# Patient Record
Sex: MALE | HISPANIC OR LATINO | Employment: OTHER | URBAN - METROPOLITAN AREA
[De-identification: names, ages, dates, MRNs, and addresses within clinical notes are randomized per-mention and may not be internally consistent; named-entity substitution may affect disease eponyms.]

---

## 2017-12-28 ENCOUNTER — OFFICE VISIT CONVERTED (OUTPATIENT)
Dept: PULMONOLOGY | Facility: CLINIC | Age: 72
End: 2017-12-28
Attending: INTERNAL MEDICINE

## 2019-09-05 ENCOUNTER — HOSPITAL ENCOUNTER (OUTPATIENT)
Dept: FAMILY MEDICINE CLINIC | Facility: CLINIC | Age: 74
Discharge: HOME | End: 2019-09-05
Attending: FAMILY MEDICINE

## 2019-09-05 ENCOUNTER — OFFICE VISIT CONVERTED (OUTPATIENT)
Dept: FAMILY MEDICINE CLINIC | Facility: CLINIC | Age: 74
End: 2019-09-05
Attending: FAMILY MEDICINE

## 2019-09-05 LAB
ALBUMIN SERPL-MCNC: 3.9 G/DL (ref 3.5–5)
ALBUMIN/GLOB SERPL: 1.1 {RATIO} (ref 1.4–2.6)
ALP SERPL-CCNC: 101 U/L (ref 56–155)
ALT SERPL-CCNC: 22 U/L (ref 10–40)
ANION GAP SERPL CALC-SCNC: 25 MMOL/L (ref 8–19)
AST SERPL-CCNC: 37 U/L (ref 15–50)
BASOPHILS # BLD AUTO: 0.05 10*3/UL (ref 0–0.2)
BASOPHILS NFR BLD AUTO: 0.9 % (ref 0–3)
BILIRUB SERPL-MCNC: 1.72 MG/DL (ref 0.2–1.3)
BNP SERPL-MCNC: 6656 PG/ML (ref 0–900)
BUN SERPL-MCNC: 35 MG/DL (ref 5–25)
BUN/CREAT SERPL: 21 {RATIO} (ref 6–20)
CALCIUM SERPL-MCNC: 9.7 MG/DL (ref 8.7–10.4)
CHLORIDE SERPL-SCNC: 96 MMOL/L (ref 99–111)
CONV ABS IMM GRAN: 0.01 10*3/UL (ref 0–0.2)
CONV CO2: 26 MMOL/L (ref 22–32)
CONV IMMATURE GRAN: 0.2 % (ref 0–1.8)
CONV TOTAL PROTEIN: 7.3 G/DL (ref 6.3–8.2)
CREAT UR-MCNC: 1.65 MG/DL (ref 0.7–1.2)
DEPRECATED RDW RBC AUTO: 60.5 FL (ref 35.1–43.9)
EOSINOPHIL # BLD AUTO: 0.36 10*3/UL (ref 0–0.7)
EOSINOPHIL # BLD AUTO: 6.2 % (ref 0–7)
ERYTHROCYTE [DISTWIDTH] IN BLOOD BY AUTOMATED COUNT: 15.9 % (ref 11.6–14.4)
GFR SERPLBLD BASED ON 1.73 SQ M-ARVRAT: 40 ML/MIN/{1.73_M2}
GLOBULIN UR ELPH-MCNC: 3.4 G/DL (ref 2–3.5)
GLUCOSE SERPL-MCNC: 79 MG/DL (ref 70–99)
HCT VFR BLD AUTO: 57.3 % (ref 42–52)
HGB BLD-MCNC: 18.2 G/DL (ref 14–18)
LYMPHOCYTES # BLD AUTO: 0.79 10*3/UL (ref 1–5)
LYMPHOCYTES NFR BLD AUTO: 13.6 % (ref 20–45)
MCH RBC QN AUTO: 32.4 PG (ref 27–31)
MCHC RBC AUTO-ENTMCNC: 31.8 G/DL (ref 33–37)
MCV RBC AUTO: 102.1 FL (ref 80–96)
MONOCYTES # BLD AUTO: 0.65 10*3/UL (ref 0.2–1.2)
MONOCYTES NFR BLD AUTO: 11.2 % (ref 3–10)
NEUTROPHILS # BLD AUTO: 3.95 10*3/UL (ref 2–8)
NEUTROPHILS NFR BLD AUTO: 67.9 % (ref 30–85)
NRBC CBCN: 0 % (ref 0–0.7)
OSMOLALITY SERPL CALC.SUM OF ELEC: 303 MOSM/KG (ref 273–304)
PLATELET # BLD AUTO: 144 10*3/UL (ref 130–400)
PMV BLD AUTO: 10.7 FL (ref 9.4–12.4)
POTASSIUM SERPL-SCNC: 4.2 MMOL/L (ref 3.5–5.3)
PSA SERPL-MCNC: 6.41 NG/ML (ref 0–4)
RBC # BLD AUTO: 5.61 10*6/UL (ref 4.7–6.1)
SODIUM SERPL-SCNC: 143 MMOL/L (ref 135–147)
WBC # BLD AUTO: 5.81 10*3/UL (ref 4.8–10.8)

## 2019-09-26 ENCOUNTER — PATIENT OUTREACH - CONVERTED (OUTPATIENT)
Dept: FAMILY MEDICINE CLINIC | Facility: CLINIC | Age: 74
End: 2019-09-26
Attending: FAMILY MEDICINE

## 2019-12-05 ENCOUNTER — HOSPITAL ENCOUNTER (OUTPATIENT)
Dept: FAMILY MEDICINE CLINIC | Facility: CLINIC | Age: 74
Discharge: HOME | End: 2019-12-05
Attending: FAMILY MEDICINE

## 2019-12-05 ENCOUNTER — OFFICE VISIT CONVERTED (OUTPATIENT)
Dept: FAMILY MEDICINE CLINIC | Facility: CLINIC | Age: 74
End: 2019-12-05
Attending: FAMILY MEDICINE

## 2019-12-05 LAB
ALBUMIN SERPL-MCNC: 4.4 G/DL (ref 3.5–5)
ALBUMIN/GLOB SERPL: 1.4 {RATIO} (ref 1.4–2.6)
ALP SERPL-CCNC: 164 U/L (ref 56–155)
ALT SERPL-CCNC: 16 U/L (ref 10–40)
ANION GAP SERPL CALC-SCNC: 22 MMOL/L (ref 8–19)
AST SERPL-CCNC: 21 U/L (ref 15–50)
BASOPHILS # BLD AUTO: 0.06 10*3/UL (ref 0–0.2)
BASOPHILS NFR BLD AUTO: 0.9 % (ref 0–3)
BILIRUB SERPL-MCNC: 0.48 MG/DL (ref 0.2–1.3)
BUN SERPL-MCNC: 30 MG/DL (ref 5–25)
BUN/CREAT SERPL: 16 {RATIO} (ref 6–20)
CALCIUM SERPL-MCNC: 9.4 MG/DL (ref 8.7–10.4)
CHLORIDE SERPL-SCNC: 103 MMOL/L (ref 99–111)
CONV ABS IMM GRAN: 0.02 10*3/UL (ref 0–0.2)
CONV CO2: 24 MMOL/L (ref 22–32)
CONV IMMATURE GRAN: 0.3 % (ref 0–1.8)
CONV TOTAL PROTEIN: 7.6 G/DL (ref 6.3–8.2)
CREAT UR-MCNC: 1.89 MG/DL (ref 0.7–1.2)
DEPRECATED RDW RBC AUTO: 47.7 FL (ref 35.1–43.9)
EOSINOPHIL # BLD AUTO: 0.34 10*3/UL (ref 0–0.7)
EOSINOPHIL # BLD AUTO: 5.2 % (ref 0–7)
ERYTHROCYTE [DISTWIDTH] IN BLOOD BY AUTOMATED COUNT: 12.9 % (ref 11.6–14.4)
GFR SERPLBLD BASED ON 1.73 SQ M-ARVRAT: 34 ML/MIN/{1.73_M2}
GLOBULIN UR ELPH-MCNC: 3.2 G/DL (ref 2–3.5)
GLUCOSE SERPL-MCNC: 110 MG/DL (ref 70–99)
HCT VFR BLD AUTO: 48.9 % (ref 42–52)
HGB BLD-MCNC: 15.9 G/DL (ref 14–18)
LYMPHOCYTES # BLD AUTO: 1.2 10*3/UL (ref 1–5)
LYMPHOCYTES NFR BLD AUTO: 18.3 % (ref 20–45)
MCH RBC QN AUTO: 32.4 PG (ref 27–31)
MCHC RBC AUTO-ENTMCNC: 32.5 G/DL (ref 33–37)
MCV RBC AUTO: 99.6 FL (ref 80–96)
MONOCYTES # BLD AUTO: 0.6 10*3/UL (ref 0.2–1.2)
MONOCYTES NFR BLD AUTO: 9.2 % (ref 3–10)
NEUTROPHILS # BLD AUTO: 4.32 10*3/UL (ref 2–8)
NEUTROPHILS NFR BLD AUTO: 66.1 % (ref 30–85)
NRBC CBCN: 0 % (ref 0–0.7)
OSMOLALITY SERPL CALC.SUM OF ELEC: 307 MOSM/KG (ref 273–304)
PLATELET # BLD AUTO: 159 10*3/UL (ref 130–400)
PMV BLD AUTO: 10.8 FL (ref 9.4–12.4)
POTASSIUM SERPL-SCNC: 4.2 MMOL/L (ref 3.5–5.3)
PSA SERPL-MCNC: 4.49 NG/ML (ref 0–4)
RBC # BLD AUTO: 4.91 10*6/UL (ref 4.7–6.1)
SODIUM SERPL-SCNC: 145 MMOL/L (ref 135–147)
WBC # BLD AUTO: 6.54 10*3/UL (ref 4.8–10.8)

## 2020-01-01 ENCOUNTER — OFFICE (OUTPATIENT)
Dept: RURAL CLINIC 3 | Facility: CLINIC | Age: 75
End: 2020-01-01
Payer: MEDICAID

## 2020-01-01 VITALS
WEIGHT: 225 LBS | SYSTOLIC BLOOD PRESSURE: 189 MMHG | HEART RATE: 72 BPM | HEIGHT: 70 IN | DIASTOLIC BLOOD PRESSURE: 97 MMHG

## 2020-01-01 DIAGNOSIS — I48.91 UNSPECIFIED ATRIAL FIBRILLATION: ICD-10-CM

## 2020-01-01 DIAGNOSIS — R74.01 ELEVATION OF LEVELS OF LIVER TRANSAMINASE LEVELS: ICD-10-CM

## 2020-01-01 DIAGNOSIS — K85.90 ACUTE PANCREATITIS WITHOUT NECROSIS OR INFECTION, UNSPECIFIE: ICD-10-CM

## 2020-01-01 DIAGNOSIS — Z79.01 LONG TERM (CURRENT) USE OF ANTICOAGULANTS: ICD-10-CM

## 2020-01-01 DIAGNOSIS — Z86.010 PERSONAL HISTORY OF COLONIC POLYPS: ICD-10-CM

## 2020-01-01 DIAGNOSIS — N18.4 CHRONIC KIDNEY DISEASE, STAGE 4 (SEVERE): ICD-10-CM

## 2020-01-01 PROCEDURE — 99204 OFFICE O/P NEW MOD 45 MIN: CPT | Performed by: INTERNAL MEDICINE

## 2020-01-06 ENCOUNTER — HOSPITAL ENCOUNTER (OUTPATIENT)
Dept: FAMILY MEDICINE CLINIC | Facility: CLINIC | Age: 75
Discharge: HOME | End: 2020-01-06
Attending: FAMILY MEDICINE

## 2020-01-06 ENCOUNTER — OFFICE VISIT CONVERTED (OUTPATIENT)
Dept: FAMILY MEDICINE CLINIC | Facility: CLINIC | Age: 75
End: 2020-01-06
Attending: FAMILY MEDICINE

## 2020-01-06 LAB
ALBUMIN SERPL-MCNC: 4.3 G/DL (ref 3.5–5)
ALBUMIN/GLOB SERPL: 1.7 {RATIO} (ref 1.4–2.6)
ALP SERPL-CCNC: 123 U/L (ref 56–155)
ALT SERPL-CCNC: 18 U/L (ref 10–40)
ANION GAP SERPL CALC-SCNC: 16 MMOL/L (ref 8–19)
AST SERPL-CCNC: 22 U/L (ref 15–50)
BILIRUB SERPL-MCNC: 0.48 MG/DL (ref 0.2–1.3)
BUN SERPL-MCNC: 17 MG/DL (ref 5–25)
BUN/CREAT SERPL: 11 {RATIO} (ref 6–20)
CALCIUM SERPL-MCNC: 9.1 MG/DL (ref 8.7–10.4)
CHLORIDE SERPL-SCNC: 110 MMOL/L (ref 99–111)
CONV CO2: 22 MMOL/L (ref 22–32)
CONV TOTAL PROTEIN: 6.9 G/DL (ref 6.3–8.2)
CREAT UR-MCNC: 1.55 MG/DL (ref 0.7–1.2)
EST. AVERAGE GLUCOSE BLD GHB EST-MCNC: 111 MG/DL
GFR SERPLBLD BASED ON 1.73 SQ M-ARVRAT: 43 ML/MIN/{1.73_M2}
GLOBULIN UR ELPH-MCNC: 2.6 G/DL (ref 2–3.5)
GLUCOSE SERPL-MCNC: 102 MG/DL (ref 70–99)
HBA1C MFR BLD: 5.5 % (ref 3.5–5.7)
OSMOLALITY SERPL CALC.SUM OF ELEC: 300 MOSM/KG (ref 273–304)
POTASSIUM SERPL-SCNC: 4.1 MMOL/L (ref 3.5–5.3)
SODIUM SERPL-SCNC: 144 MMOL/L (ref 135–147)

## 2020-02-03 ENCOUNTER — OFFICE VISIT CONVERTED (OUTPATIENT)
Dept: FAMILY MEDICINE CLINIC | Facility: CLINIC | Age: 75
End: 2020-02-03
Attending: FAMILY MEDICINE

## 2020-02-20 ENCOUNTER — HOSPITAL ENCOUNTER (OUTPATIENT)
Dept: SLEEP MEDICINE | Facility: HOSPITAL | Age: 75
Discharge: HOME | End: 2020-02-20
Attending: INTERNAL MEDICINE

## 2020-02-26 ENCOUNTER — PATIENT OUTREACH - CONVERTED (OUTPATIENT)
Dept: FAMILY MEDICINE CLINIC | Facility: CLINIC | Age: 75
End: 2020-02-26
Attending: FAMILY MEDICINE

## 2020-03-02 ENCOUNTER — OFFICE VISIT CONVERTED (OUTPATIENT)
Dept: FAMILY MEDICINE CLINIC | Facility: CLINIC | Age: 75
End: 2020-03-02
Attending: FAMILY MEDICINE

## 2020-03-16 ENCOUNTER — OFFICE VISIT CONVERTED (OUTPATIENT)
Dept: FAMILY MEDICINE CLINIC | Facility: CLINIC | Age: 75
End: 2020-03-16
Attending: FAMILY MEDICINE

## 2020-03-16 ENCOUNTER — CONVERSION ENCOUNTER (OUTPATIENT)
Dept: FAMILY MEDICINE CLINIC | Facility: CLINIC | Age: 75
End: 2020-03-16

## 2020-03-30 ENCOUNTER — PATIENT OUTREACH - CONVERTED (OUTPATIENT)
Dept: FAMILY MEDICINE CLINIC | Facility: CLINIC | Age: 75
End: 2020-03-30
Attending: FAMILY MEDICINE

## 2020-03-31 ENCOUNTER — CONVERSION ENCOUNTER (OUTPATIENT)
Dept: FAMILY MEDICINE CLINIC | Facility: CLINIC | Age: 75
End: 2020-03-31

## 2020-03-31 ENCOUNTER — OFFICE VISIT CONVERTED (OUTPATIENT)
Dept: FAMILY MEDICINE CLINIC | Facility: CLINIC | Age: 75
End: 2020-03-31
Attending: FAMILY MEDICINE

## 2020-05-26 ENCOUNTER — HOSPITAL ENCOUNTER (OUTPATIENT)
Dept: OTHER | Facility: HOSPITAL | Age: 75
Discharge: HOME | End: 2020-05-26
Attending: FAMILY MEDICINE

## 2020-05-26 LAB
ANION GAP SERPL CALC-SCNC: 22 MMOL/L (ref 8–19)
BNP SERPL-MCNC: 6788 PG/ML (ref 0–900)
BUN SERPL-MCNC: 32 MG/DL (ref 5–25)
BUN/CREAT SERPL: 18 {RATIO} (ref 6–20)
CALCIUM SERPL-MCNC: 9.4 MG/DL (ref 8.7–10.4)
CHLORIDE SERPL-SCNC: 108 MMOL/L (ref 99–111)
CONV CO2: 18 MMOL/L (ref 22–32)
CREAT UR-MCNC: 1.73 MG/DL (ref 0.7–1.2)
GFR SERPLBLD BASED ON 1.73 SQ M-ARVRAT: 38 ML/MIN/{1.73_M2}
GLUCOSE SERPL-MCNC: 92 MG/DL (ref 70–99)
OSMOLALITY SERPL CALC.SUM OF ELEC: 303 MOSM/KG (ref 273–304)
POTASSIUM SERPL-SCNC: 4.5 MMOL/L (ref 3.5–5.3)
SODIUM SERPL-SCNC: 143 MMOL/L (ref 135–147)

## 2020-07-29 ENCOUNTER — PATIENT OUTREACH - CONVERTED (OUTPATIENT)
Dept: FAMILY MEDICINE CLINIC | Facility: CLINIC | Age: 75
End: 2020-07-29
Attending: FAMILY MEDICINE

## 2020-09-17 ENCOUNTER — OFFICE VISIT CONVERTED (OUTPATIENT)
Dept: FAMILY MEDICINE CLINIC | Facility: CLINIC | Age: 75
End: 2020-09-17
Attending: FAMILY MEDICINE

## 2020-10-13 ENCOUNTER — HOSPITAL ENCOUNTER (OUTPATIENT)
Dept: FAMILY MEDICINE CLINIC | Facility: CLINIC | Age: 75
Discharge: HOME | End: 2020-10-13
Attending: NURSE PRACTITIONER

## 2020-10-13 ENCOUNTER — OFFICE VISIT CONVERTED (OUTPATIENT)
Dept: FAMILY MEDICINE CLINIC | Facility: CLINIC | Age: 75
End: 2020-10-13
Attending: NURSE PRACTITIONER

## 2020-10-13 LAB
ALBUMIN SERPL-MCNC: 4.3 G/DL (ref 3.5–5)
ALBUMIN/GLOB SERPL: 1.3 {RATIO} (ref 1.4–2.6)
ALP SERPL-CCNC: 80 U/L (ref 56–155)
ALT SERPL-CCNC: 47 U/L (ref 10–40)
AMYLASE SERPL-CCNC: 167 U/L (ref 30–150)
ANION GAP SERPL CALC-SCNC: 17 MMOL/L (ref 8–19)
AST SERPL-CCNC: 27 U/L (ref 15–50)
BILIRUB SERPL-MCNC: 0.66 MG/DL (ref 0.2–1.3)
BUN SERPL-MCNC: 23 MG/DL (ref 5–25)
BUN/CREAT SERPL: 13 {RATIO} (ref 6–20)
CALCIUM SERPL-MCNC: 9.4 MG/DL (ref 8.7–10.4)
CHLORIDE SERPL-SCNC: 99 MMOL/L (ref 99–111)
CONV CO2: 27 MMOL/L (ref 22–32)
CONV TOTAL PROTEIN: 7.7 G/DL (ref 6.3–8.2)
CREAT UR-MCNC: 1.81 MG/DL (ref 0.7–1.2)
GFR SERPLBLD BASED ON 1.73 SQ M-ARVRAT: 36 ML/MIN/{1.73_M2}
GLOBULIN UR ELPH-MCNC: 3.4 G/DL (ref 2–3.5)
GLUCOSE SERPL-MCNC: 107 MG/DL (ref 70–99)
LIPASE SERPL-CCNC: 373 U/L (ref 5–51)
OSMOLALITY SERPL CALC.SUM OF ELEC: 292 MOSM/KG (ref 273–304)
POTASSIUM SERPL-SCNC: 4.1 MMOL/L (ref 3.5–5.3)
SODIUM SERPL-SCNC: 139 MMOL/L (ref 135–147)

## 2020-10-22 ENCOUNTER — HOSPITAL ENCOUNTER (OUTPATIENT)
Dept: FAMILY MEDICINE CLINIC | Facility: CLINIC | Age: 75
Discharge: HOME | End: 2020-10-22
Attending: NURSE PRACTITIONER

## 2020-10-22 LAB
ALBUMIN SERPL-MCNC: 4.1 G/DL (ref 3.5–5)
ALBUMIN/GLOB SERPL: 1.4 {RATIO} (ref 1.4–2.6)
ALP SERPL-CCNC: 88 U/L (ref 56–155)
ALT SERPL-CCNC: 18 U/L (ref 10–40)
AMYLASE SERPL-CCNC: 176 U/L (ref 30–150)
ANION GAP SERPL CALC-SCNC: 16 MMOL/L (ref 8–19)
AST SERPL-CCNC: 23 U/L (ref 15–50)
BASOPHILS # BLD AUTO: 0.07 10*3/UL (ref 0–0.2)
BASOPHILS NFR BLD AUTO: 1 % (ref 0–3)
BILIRUB SERPL-MCNC: 0.4 MG/DL (ref 0.2–1.3)
BUN SERPL-MCNC: 36 MG/DL (ref 5–25)
BUN/CREAT SERPL: 24 {RATIO} (ref 6–20)
CALCIUM SERPL-MCNC: 9.5 MG/DL (ref 8.7–10.4)
CHLORIDE SERPL-SCNC: 106 MMOL/L (ref 99–111)
CHOLEST SERPL-MCNC: 188 MG/DL (ref 107–200)
CHOLEST/HDLC SERPL: 4.3 {RATIO} (ref 3–6)
CONV ABS IMM GRAN: 0.04 10*3/UL (ref 0–0.2)
CONV BILI, CONJUGATED: <0.2 MG/DL (ref 0–0.6)
CONV CO2: 25 MMOL/L (ref 22–32)
CONV IMMATURE GRAN: 0.6 % (ref 0–1.8)
CONV TOTAL PROTEIN: 7.1 G/DL (ref 6.3–8.2)
CONV UNCONJUGATED BILIRUBIN: 0.2 MG/DL (ref 0–1.1)
CREAT UR-MCNC: 1.53 MG/DL (ref 0.7–1.2)
DEPRECATED RDW RBC AUTO: 46.4 FL (ref 35.1–43.9)
EOSINOPHIL # BLD AUTO: 0.16 10*3/UL (ref 0–0.7)
EOSINOPHIL # BLD AUTO: 2.3 % (ref 0–7)
ERYTHROCYTE [DISTWIDTH] IN BLOOD BY AUTOMATED COUNT: 13 % (ref 11.6–14.4)
GFR SERPLBLD BASED ON 1.73 SQ M-ARVRAT: 44 ML/MIN/{1.73_M2}
GLOBULIN UR ELPH-MCNC: 3 G/DL (ref 2–3.5)
GLUCOSE SERPL-MCNC: 114 MG/DL (ref 70–99)
HCT VFR BLD AUTO: 46.8 % (ref 42–52)
HDLC SERPL-MCNC: 44 MG/DL (ref 40–60)
HGB BLD-MCNC: 15 G/DL (ref 14–18)
LDLC SERPL CALC-MCNC: 109 MG/DL (ref 70–100)
LIPASE SERPL-CCNC: 275 U/L (ref 5–51)
LYMPHOCYTES # BLD AUTO: 1.76 10*3/UL (ref 1–5)
LYMPHOCYTES NFR BLD AUTO: 25.7 % (ref 20–45)
MCH RBC QN AUTO: 31.1 PG (ref 27–31)
MCHC RBC AUTO-ENTMCNC: 32.1 G/DL (ref 33–37)
MCV RBC AUTO: 96.9 FL (ref 80–96)
MONOCYTES # BLD AUTO: 0.44 10*3/UL (ref 0.2–1.2)
MONOCYTES NFR BLD AUTO: 6.4 % (ref 3–10)
NEUTROPHILS # BLD AUTO: 4.39 10*3/UL (ref 2–8)
NEUTROPHILS NFR BLD AUTO: 64 % (ref 30–85)
NRBC CBCN: 0 % (ref 0–0.7)
OSMOLALITY SERPL CALC.SUM OF ELEC: 303 MOSM/KG (ref 273–304)
PLATELET # BLD AUTO: 210 10*3/UL (ref 130–400)
PMV BLD AUTO: 9.9 FL (ref 9.4–12.4)
POTASSIUM SERPL-SCNC: 4.9 MMOL/L (ref 3.5–5.3)
RBC # BLD AUTO: 4.83 10*6/UL (ref 4.7–6.1)
SODIUM SERPL-SCNC: 142 MMOL/L (ref 135–147)
T4 FREE SERPL-MCNC: 1.2 NG/DL (ref 0.9–1.8)
TRIGL SERPL-MCNC: 175 MG/DL (ref 40–150)
TSH SERPL-ACNC: 2.18 M[IU]/L (ref 0.27–4.2)
VLDLC SERPL-MCNC: 35 MG/DL (ref 5–37)
WBC # BLD AUTO: 6.86 10*3/UL (ref 4.8–10.8)

## 2020-10-30 ENCOUNTER — HOSPITAL ENCOUNTER (OUTPATIENT)
Dept: FAMILY MEDICINE CLINIC | Facility: CLINIC | Age: 75
Discharge: HOME | End: 2020-10-30
Attending: NURSE PRACTITIONER

## 2020-10-31 LAB
AMYLASE SERPL-CCNC: 129 U/L (ref 30–150)
LIPASE SERPL-CCNC: 164 U/L (ref 5–51)

## 2020-11-16 ENCOUNTER — OFFICE VISIT CONVERTED (OUTPATIENT)
Dept: PULMONOLOGY | Facility: CLINIC | Age: 75
End: 2020-11-16
Attending: INTERNAL MEDICINE

## 2020-11-30 ENCOUNTER — PATIENT OUTREACH - CONVERTED (OUTPATIENT)
Dept: FAMILY MEDICINE CLINIC | Facility: CLINIC | Age: 75
End: 2020-11-30
Attending: FAMILY MEDICINE

## 2021-01-26 ENCOUNTER — CONVERSION ENCOUNTER (OUTPATIENT)
Dept: FAMILY MEDICINE CLINIC | Facility: CLINIC | Age: 76
End: 2021-01-26

## 2021-01-26 ENCOUNTER — OFFICE VISIT CONVERTED (OUTPATIENT)
Dept: FAMILY MEDICINE CLINIC | Facility: CLINIC | Age: 76
End: 2021-01-26
Attending: FAMILY MEDICINE

## 2021-01-28 ENCOUNTER — PATIENT OUTREACH - CONVERTED (OUTPATIENT)
Dept: FAMILY MEDICINE CLINIC | Facility: CLINIC | Age: 76
End: 2021-01-28
Attending: FAMILY MEDICINE

## 2021-01-28 ENCOUNTER — HOSPITAL ENCOUNTER (OUTPATIENT)
Dept: OTHER | Facility: HOSPITAL | Age: 76
Discharge: HOME | End: 2021-01-28
Attending: FAMILY MEDICINE

## 2021-03-08 ENCOUNTER — PATIENT OUTREACH - CONVERTED (OUTPATIENT)
Dept: FAMILY MEDICINE CLINIC | Facility: CLINIC | Age: 76
End: 2021-03-08
Attending: FAMILY MEDICINE

## 2021-03-17 ENCOUNTER — HOSPITAL ENCOUNTER (OUTPATIENT)
Dept: VACCINE CLINIC | Facility: HOSPITAL | Age: 76
Discharge: HOME | End: 2021-03-17
Attending: INTERNAL MEDICINE

## 2021-05-07 NOTE — PROGRESS NOTES
Progress Note      Patient Name: Edmond Lujan   Patient ID: [id number]   Sex: Male   YOB: 1945    Primary Care Provider: Ambrocio Guy MD   Referring Provider: Ambrocio Guy MD    Visit Date: January 6, 2020    Provider: Ambrocio Guy MD   Location: [facility]   Location Address: [address] [place] [address] [place]  [address]   Location Phone: [phone]          Chief Complaint     refills       History Of Present Illness  Edmond Lujan is a 74 year old Other Race,  or  male who presents for evaluation and treatment of:      discussed labs  elevated PSA- reminded pt of his appt to urology for elevated PSA  elevated blood glucose  pt needs cane due to trouble walking with CHF, COPD       Past Medical History  Disease Name Date Onset Notes   Atrial Fibrillation --  --    Benign prostatic hypertrophy (BPH) with incomplete bladder emptying --  --    Chronic kidney disease --  --    Decreased GFR --  --    Dermatitis --  --    Dysuria --  --    GERD (gastroesophageal reflux disease) --  --    Hematuria --  --    High triglycerides --  --    Hyperlipidemia --  --    Hypertension --  --    Osteoarthritis --  --    Renal insufficiency --  --    Thrombocytopenia --  --          Past Surgical History  Procedure Name Date Notes   Hip Surgery --  --    Prostate Surgery --  --          Medication List  Name Date Started Instructions   allopurinol 100 mg oral tablet 10/02/2019 take 1 tablet (100 mg) by oral route once daily for 90 days   Anoro Ellipta 62.5-25 mcg/actuation inhalation blister with device 10/02/2019 inhale 1 puff by inhalation route once daily at the same time each day for 90 days   atorvastatin 40 mg oral tablet 10/02/2019 take 1 tablet (40 mg) by oral route once daily at bedtime for 90 days   Cartia  mg oral capsule,extended release 24hr 10/02/2019 take 1 capsule (240 mg) by oral route once daily for 90 days   Eliquis 5 mg oral tablet 09/26/2019  take 1 tablet by oral route 2 times a day for 90 days   fluticasone propionate 50 mcg/actuation nasal spray,suspension 09/16/2019 spray 1 spray (50 mcg) in each nostril by intranasal route once daily for 30 days   furosemide 80 mg oral tablet 10/02/2019 take 1 tablet (80 mg) by oral route once daily for 90 days   hydralazine 10 mg oral tablet 10/02/2019 take 1 tablet (10 mg) by oral route 4 times per day with food for 90 days   Incruse Ellipta 62.5 mcg/actuation inhalation blister with device 09/16/2019 inhale 1 puff daily   omega-3 acid ethyl esters 1 gram oral capsule 09/16/2019 TAKE TWO CAPSULES BY MOUTH TWICE A DAY   potassium chloride 10 mEq oral capsule, extended release 10/02/2019 take 1 capsule (10 meq) by oral route 2 times per day with food for 90 days   ProAir HFA 90 mcg/actuation inhalation HFA aerosol inhaler 09/16/2019 inhale 1 - 2 puffs (90 - 180 mcg) by inhalation route every 4-6 hours as needed for 30 days   Protonix 40 mg oral tablet,delayed release (DR/EC) 10/02/2019 take 1 tablet (40 mg) by oral route once daily for 90 days   tamsulosin 0.4 mg oral capsule 10/02/2019 take 1 capsule (0.4 mg) by oral route once daily 1/2 hour following the same meal each day for 90 days   Zyrtec 10 mg oral tablet 10/02/2019 take 1 tablet at bedtime         Allergy List  Allergen Name Date Reaction Notes   NO KNOWN DRUG ALLERGIES --  --  --          Social History  Finding Status Start/Stop Quantity Notes   Tobacco Former --/-- --  --          Immunizations  NameDate Admin Mfg Trade Name Lot Number Route Inj VIS Given VIS Publication   Ygexdaols84/05/2019 PMC FLUZONE-HIGH DOSE [id number] LA 12/05/2019    Comments: NDC 02358-309-17   InfluenzaUnknown UNK Unknown TradeName [id number] NE NE 11/29/2017 08/07/2015   Comments: this is recorded out of the old system   Qtzwqkisv4861/05/2019 MSD PNEUMOVAX 23 [id number] IM RD 12/05/2019    Comments: NDC 8767-0307-13   TdapUnknown PMC ADACEL [id number] NE NE 11/29/2017 02/24/2015    Comments: this is recorded out of old system/ ES         Review of Systems  · Constitutional  o Denies  o : fatigue, fever  · Cardiovascular  o Denies  o : chest pain, palpitations  · Respiratory  o Denies  o : shortness of breath, cough  · Gastrointestinal  o Denies  o : nausea, vomiting, diarrhea  · Psychiatric  o Denies  o : anxiety, depression      Vitals  Date Time BP Position Site L\R Cuff Size HR RR TEMP (F) WT  HT  BMI kg/m2 BSA m2 O2 Sat HC       01/06/2020 10:18 /80 Sitting    96 - R  97.6 197lbs 0oz    98 %          Physical Examination  · Constitutional  o Appearance  o : well developed, well-nourished, in no acute distress  · Respiratory  o Respiratory Effort  o : breathing unlabored  o Auscultation of Lungs  o : clear to ascultation  · Cardiovascular  o Heart  o :   § Auscultation of Heart  § : regular rate and rhythm  o Peripheral Vascular System  o :   § Extremities  § : no edema  · Gastrointestinal  o Abdomen  o : soft, non-tender, non-distended, + bowel sounds, no hepatosplenomegaly, no masses palpated  · Musculoskeletal  o General  o :   § General Musculoskeletal  § : No joint swelling or deformity., Muscle tone, strength, and development grossly normal.  · Neurologic  o Gait and Station  o :   § Gait Screening  § : slightly unsteady gait due to fatigue with walking down hallway  · Psychiatric  o Mood and Affect  o : mood normal, affect appropriate          Assessment  · CHF (congestive heart failure)     428.0/I50.9  · COPD (chronic obstructive pulmonary disease)     496/J44.9  · Elevated random blood glucose level     790.29/R73.09  · Renal insufficiency     593.9/N28.9    Problems Reconciled  Plan  · Orders  o CMP [facility] ([id number]) - 790.29/R73.09 - 01/06/2020  o Hgb A1c [facility] ([id number]) - 790.29/R73.09 - 01/06/2020  o ACO-39: Current medications updated and reviewed () - - 01/06/2020  o NEPHROLOGY CONSULTATION (NEPHR) - 790.29/R73.09, 593.9/N28.9 - 01/06/2020  · Medications  o cane  miscellaneous device   SIG: use as directed for 30 days   DISP: (1) Box with 0 refills  Prescribed on 01/06/2020     o Eliquis 5 mg oral tablet   SIG: take 1 tablet by oral route 2 times a day for 90 days   DISP: (180) tablet with 1 refills  Adjusted on 01/06/2020     o Incruse Ellipta 62.5 mcg/actuation inhalation blister with device   SIG: inhale 1 puff daily   DISP: (3) 30 ct blist pack with 1 refills  Adjusted on 01/06/2020     o omega-3 acid ethyl esters 1 gram oral capsule   SIG: TAKE TWO CAPSULES BY MOUTH TWICE A DAY   DISP: (360) Capsule with 1 refills  Adjusted on 01/06/2020     o ProAir HFA 90 mcg/actuation inhalation HFA aerosol inhaler   SIG: inhale 1 - 2 puffs (90 - 180 mcg) by inhalation route every 4-6 hours as needed for 90 days   DISP: (3) 8.5 gm canister with 1 refills  Adjusted on 01/06/2020     o Medications have been Reconciled  o Transition of Care or Provider Policy  · Instructions  o Patient was educated/instructed on their diagnosis, treatment and medications prior to discharge from the clinic today.            Electronically Signed by: Ambrocio Guy MD -Author on January 6, 2020 10:39:27 AM

## 2021-05-07 NOTE — PROGRESS NOTES
Progress Note      Patient Name: Edmond Lujan   Patient ID: [id number]   Sex: Male   YOB: 1945    Primary Care Provider: Ambrocio Guy MD   Referring Provider: Ambrocio Guy MD    Visit Date: December 5, 2019    Provider: Ambrocio Guy MD   Location: [facility]   Location Address: [address] [place] [address] [place]  [address]   Location Phone: [phone]          Chief Complaint     3 Month follow up and questions about oxygen       History Of Present Illness  Edmond Lujan is a 74 year old Other Race,  or  male who presents for evaluation and treatment of:      pt following up  pt said that he never followed up with urology for PSA due to wife being sick- explained again to pt that elevated PSA might be prostate cancer that could kill him- pt will see urology    pt not using the overnight oxygen that hospital ordered due to it keeps his wife awake- pt requesting overnight oximetry recheck- I ordered overnight oximetry from [facility]    hyperlipidemia- unknown control- need labs    pt never followed up with cardiology       Past Medical History  Disease Name Date Onset Notes   Atrial Fibrillation --  --    Benign prostatic hypertrophy (BPH) with incomplete bladder emptying --  --    Chronic kidney disease --  --    Decreased GFR --  --    Dermatitis --  --    Dysuria --  --    GERD (gastroesophageal reflux disease) --  --    Hematuria --  --    High triglycerides --  --    Hyperlipidemia --  --    Hypertension --  --    Osteoarthritis --  --    Renal insufficiency --  --    Thrombocytopenia --  --          Past Surgical History  Procedure Name Date Notes   Hip Surgery --  --    Prostate Surgery --  --          Medication List  Name Date Started Instructions   allopurinol 100 mg oral tablet 10/02/2019 take 1 tablet (100 mg) by oral route once daily for 90 days   Anoro Ellipta 62.5-25 mcg/actuation inhalation blister with device 10/02/2019 inhale 1 puff by  inhalation route once daily at the same time each day for 90 days   atorvastatin 40 mg oral tablet 10/02/2019 take 1 tablet (40 mg) by oral route once daily at bedtime for 90 days   Cartia  mg oral capsule,extended release 24hr 10/02/2019 take 1 capsule (240 mg) by oral route once daily for 90 days   Eliquis 5 mg oral tablet 09/26/2019 take 1 tablet by oral route 2 times a day for 90 days   fluticasone propionate 50 mcg/actuation nasal spray,suspension 09/16/2019 spray 1 spray (50 mcg) in each nostril by intranasal route once daily for 30 days   furosemide 80 mg oral tablet 10/02/2019 take 1 tablet (80 mg) by oral route once daily for 90 days   hydralazine 10 mg oral tablet 10/02/2019 take 1 tablet (10 mg) by oral route 4 times per day with food for 90 days   Incruse Ellipta 62.5 mcg/actuation inhalation blister with device 09/16/2019 inhale 1 puff daily   omega-3 acid ethyl esters 1 gram oral capsule 09/16/2019 TAKE TWO CAPSULES BY MOUTH TWICE A DAY   potassium chloride 10 mEq oral capsule, extended release 10/02/2019 take 1 capsule (10 meq) by oral route 2 times per day with food for 90 days   ProAir HFA 90 mcg/actuation inhalation HFA aerosol inhaler 09/16/2019 inhale 1 - 2 puffs (90 - 180 mcg) by inhalation route every 4-6 hours as needed for 30 days   Protonix 40 mg oral tablet,delayed release (DR/EC) 10/02/2019 take 1 tablet (40 mg) by oral route once daily for 90 days   tamsulosin 0.4 mg oral capsule 10/02/2019 take 1 capsule (0.4 mg) by oral route once daily 1/2 hour following the same meal each day for 90 days   Zyrtec 10 mg oral tablet 10/02/2019 take 1 tablet at bedtime         Allergy List  Allergen Name Date Reaction Notes   NO KNOWN DRUG ALLERGIES --  --  --          Social History  Finding Status Start/Stop Quantity Notes   Tobacco Former --/-- --  --          Immunizations  NameDate Admin Mfg Trade Name Lot Number Route Inj VIS Given VIS Publication   Nxupqvjsu06/05/2019 PMC FLUZONE-HIGH DOSE  [id number] [facility] 12/05/2019    Comments: NDC 43554-286-42   InfluenzaUnknown UNK Unknown TradeName [id number] NE NE 11/29/2017 08/07/2015   Comments: this is recorded out of the old system   Ytxbetxay8953/05/2019 MSD PNEUMOVAX 23 [id number]  RD 12/05/2019    Comments: NDC 2492-3227-69   TdapUnknown PMC ADACEL [id number] NE NE 11/29/2017 02/24/2015   Comments: this is recorded out of old system/ ES         Review of Systems  · Constitutional  o Denies  o : fatigue, fever  · Cardiovascular  o Denies  o : chest pain, swelling (feet, ankles, hands), palpitations  · Respiratory  o Denies  o : shortness of breath, cough  · Gastrointestinal  o Denies  o : nausea, vomiting, diarrhea  · Psychiatric  o Denies  o : anxiety, depression      Vitals  Date Time BP Position Site L\R Cuff Size HR RR TEMP (F) WT  HT  BMI kg/m2 BSA m2 O2 Sat        12/05/2019 01:25 /80 Sitting    75 - R  97.5 186lbs 6oz    98 %          Physical Examination  · Constitutional  o Appearance  o : well developed, well-nourished, in no acute distress  · Head and Face  o HEENT  o : Unremarkable  · Respiratory  o Respiratory Effort  o : breathing unlabored  o Auscultation of Lungs  o : clear to ascultation  · Cardiovascular  o Heart  o :   § Auscultation of Heart  § : regular rate and rhythm  o Peripheral Vascular System  o :   § Extremities  § : no edema, skin a little dry due to heat, per pt  · Gastrointestinal  o Abdomen  o : soft, non-tender, non-distended, + bowel sounds, no hepatosplenomegaly, no masses palpated  · Musculoskeletal  o General  o :   § General Musculoskeletal  § : No joint swelling or deformity., Muscle tone, strength, and development grossly normal.  · Neurologic  o Gait and Station  o :   § Gait Screening  § : normal gait  · Psychiatric  o Mood and Affect  o : mood normal, affect appropriate          Assessment  · CHF (congestive heart failure)     428.0/I50.9  · Hyperlipidemia     272.4/E78.5  · Elevated PSA     790.93/R97.20  · Atrial  fibrillation     427.31/I48.91  · CKD (chronic kidney disease)     585.9/N18.9    Problems Reconciled  Plan  · Orders  o CBC with Auto Diff [facility] ([id number]) - 790.93/R97.20, 428.0/I50.9, 272.4/E78.5 - 12/05/2019  o CMP [facility] (91182) - 790.93/R97.20, 428.0/I50.9, 272.4/E78.5 - 12/05/2019  o Lipid Panel [facility] (83398) - 790.93/R97.20, 428.0/I50.9, 272.4/E78.5 - 12/05/2019  o Immunization Admin Fee (2+ Injections) ([facility]) (79552) - - 12/05/2019  o Fluzone High Dose Flu Vaccine (66005) - - 12/05/2019   Vaccine - Influenza; Dose: 0.5; Site: Left Arm; Route: Intramuscular; Date: 12/05/2019 13:34:00; Exp: 06/15/2020; Lot: [id number]; Mfg: [employer] pasteur; TradeName: FLUZONE-HIGH DOSE; Administered By: James Bustos MA; Comment: NDC 83862-387-19  o ACO-39: Current medications updated and reviewed () - - 12/05/2019  o Pneumococcal Vaccine, 23 valent, adult (42139) - - 12/05/2019   Vaccine - Mrveunqog70; Dose: 0.5; Site: Right Deltoid; Route: Intramuscular; Date: 12/05/2019 13:31:00; Exp: 03/31/2021; Lot: [id number]; Mfg: [employer] TradeName: PNEUMOVAX 23; Administered By: James Bustos MA; Comment: NDC 9336-8803-52  o PSA ultrasensitive DIAGNOSTIC [facility] (12869) - 790.93/R97.20 - 12/05/2019  o UROLOGY CONSULTATION (UROLO) - 790.93/R97.20 - 12/05/2019  o NEPHROLOGY CONSULTATION (NEPHR) - 585.9/N18.9 - 12/05/2019  o CARDIOLOGY CONSULTATION (CARDI) - 428.0/I50.9, 427.31/I48.91 - 12/05/2019  · Instructions  o Advised that cheeses and other sources of dairy fats, animal fats, fast food, and the extras (candy, pasteries, pies, doughnuts and cookies) all contain LDL raising nutrients. Advised to increase fruits, vegetables, whole grains, and to monitor portion sizes.   o Patient was educated/instructed on their diagnosis, treatment and medications prior to discharge from the clinic today.            Electronically Signed by: Ambrocio Guy MD -Author on December 5, 2019 02:32:43 PM

## 2021-05-07 NOTE — OUTREACH NOTE
Quick Note      Patient Name: Edmond Lujan   Patient ID: [id number]   Sex: Male   YOB: 1945    Primary Care Provider: Ambrocio Guy MD   Referring Provider: Ambrocio Guy MD    Visit Date: March 31, 2021    Provider: Ambrocio Guy MD   Location: [facility]   Location Address: [address] [place] [address]   [place]  [address]   Location Phone: [phone]          History Of Present Illness  CCM Comprehensive Care Plan    This Chronic Medical Management Care Plan for Edmond Lujan, 75 year old Other Race,  or  male, has been monitored and managed, reviewed, revised, and and a new plan of care implemented for the month of March. A cumulative time of 30 minutes was spent on this patient record, including phone call with patient, electronic communication with primary care provider, electronic communication with other providers, and chart review.   Regarding the patient's diagnoses Anxiety and depression, Atrial Fibrillation, Benign prostatic hypertrophy (BPH) with incomplete bladder emptying, Chronic kidney disease, COPD (chronic obstructive pulmonary disease), GERD (gastroesophageal reflux disease), High triglycerides, Hyperlipidemia, Hypertension, Osteoarthritis, and Renal insufficiency, the following items were adressed: medical records, medications, changes to medical care, and transitions to medical care and any changes can be found within the plan section of the note. A detailed listing of time spent for chronic care management has been scanned into the patient's electronic record. Current medications include: allopurinol 100 mg oral tablet, cane miscellaneous device, citalopram 20 mg oral tablet, citalopram 40 mg oral tablet, diltiazem HCl 360 mg oral capsule,extended release 24 hr, Eliquis 5 mg oral tablet, fluticasone propionate 50 mcg/actuation nasal spray,suspension, furosemide 80 mg oral tablet, hydralazine 10 mg oral tablet, metoprolol tartrate 25 mg  oral tablet, omega-3 acid ethyl esters 1 gram oral capsule, omeprazole 20 mg oral capsule,delayed release(DR/EC), potassium chloride 10 mEq oral capsule, extended release, and tamsulosin 0.4 mg oral capsule and the patient is reported to be compliant with medication protocol. Medications are reported to be effective.   The patient was monitored remotely for activity level, blood glucose, and Stress levels and anxiety/depression for a period of 30 minutes.   This patient's physical needs include: help taking medications as prescribed and physical healthcare. With complexity of patients chronic conditions, patient does at times need reminders about taking medications as prescribed, but also calling if there are any medications that he is out of. Patient with multiple specialist that often require multiple appts each month. When asked about cardiology, he declined any upcoming appts. Patient did mention that the lung doctor would be doing a procedure on 03/30/2021 to try and figure out what is going on with his stomach. This is likely not the case, but I was unable to understand what provider name patient was trying to tell me. Will follow up with patient after 03/30/2021 to see what procedure was actually done, since this provider is likely a [facility] provider. Patient has been taking Eliquis for his history of Afib and reports that he has had no complications to this point.   Patient's mental support needs include: increased support and coordination of community providers. Increased support provided to patient as he is trying to manage his chronic conditions. Patient with a current history of anxiety and depression, that is exacerbated by his own declining health but also increased stress with his wife being in the nursing home.   The patient's cognitive support needs include: increased support, coordination of community providers, and requires supervision. Patient does need frequent reminders of upcoming appts, as  patient has many no-show reports in his chart. Patient has upcoming appt on 03/30/2021, will reach out to patient prior to appt to ensure he is able to keep appt.   The patient's psychosocial support needs include:, continued support, contact information, coordination of community providers, and medication monitoring and or assistance. Reminded patient that he can contact me with any needs. When asked about care management, patient reports that he is doing okay right now, but will call with any on-going needs moving forward.   This patient's functional needs are N/A.   This patient's environmental needs are N/A.           Assessment  · Chronic Care Management (CCM)     V68.89/Z02.89  · Atrial Fibrillation     427.31/I48.91  · GERD (gastroesophageal reflux disease)     530.81/K21.9  · Anxiety and depression       Anxiety disorder, unspecified     300.00/F41.9  Major depressive disorder, single episode, unspecified     300.00/F32.9      Plan  · Instructions  o Patient's Health Care Goals: Get Covid Vaccine to be able to see wife in the nursing home  o Provider's Health Care Goals: Compliance with specialty appts and medication managment  o Patient was provided an electronic copy of care plan  o CCM services were explained and offered and patient has accepted these services.  o Patient has given their written consent to recieve CCM services and understands that this includes the authorization of electronic communication of medical information with other treating providers.  o Patient understands that they may stop CCM services at any time and these changes will be effective at the end of the calendar month and will effectively revocate the agreement of CCM services.  o Patient understands that only one practioner can furnish and be paid for CCM services during one calendar month. Patient also understands that there may be co-payment or deductible fees in association with CCM services.  o Patient will continue with at  least monthly follow-up calls with the Nurse Navigator.  · Associate Tasks  o Task ID [id number] CCM: CCM - March 2021            Electronically Signed by: Ana Lilia Lam RN -Author on March 31, 2021 09:25:49 AM

## 2021-05-07 NOTE — OUTREACH NOTE
Quick Note      Patient Name: Edmond Lujan   Patient ID: [id number]   Sex: Male   YOB: 1945    Primary Care Provider: Ambrocio Guy MD   Referring Provider: Ambrocio Guy MD    Visit Date: July 29, 2020    Provider: Ambrocio Guy MD   Location: [facility]   Location Address: [address] [place] [address]   [place]  [address]   Location Phone: [phone]          History Of Present Illness  CCM Comprehensive Care Plan    This Chronic Medical Management Care Plan for Edmond Lujan, 74 year old Other Race,  or  male, has been monitored and managed, reviewed, and revised for the month of July. A cumulative time of 22 minutes was spent on this patient record, including phone call with patient and chart review.   Regarding the patient's diagnoses Anxiety and depression, Atrial Fibrillation, Benign prostatic hypertrophy (BPH) with incomplete bladder emptying, Chronic kidney disease, COPD (chronic obstructive pulmonary disease), GERD (gastroesophageal reflux disease), High triglycerides, Hyperlipidemia, Hypertension, Osteoarthritis, and Renal insufficiency, the following items were adressed: medical records and medications and any changes can be found within the plan section of the note. A detailed listing of time spent for chronic care management has been scanned into the patient's electronic record. Current medications include: allopurinol 100 mg oral tablet, Anoro Ellipta 62.5-25 mcg/actuation inhalation blister with device, atorvastatin 40 mg oral tablet, cane miscellaneous device, citalopram 40 mg oral tablet, Eliquis 2.5 mg oral tablet, Eliquis 5 mg oral tablet, fluticasone propionate 50 mcg/actuation nasal spray,suspension, furosemide 80 mg oral tablet, Incruse Ellipta 62.5 mcg/actuation inhalation blister with device, omega-3 acid ethyl esters 1 gram oral capsule, potassium chloride 10 mEq oral capsule, extended release, ProAir HFA 90 mcg/actuation inhalation  HFA aerosol inhaler, sodium bicarbonate 650 mg oral tablet, tamsulosin 0.4 mg oral capsule, verapamil 180 mg oral tablet extended release, and Zyrtec 10 mg oral tablet and the patient is reported to be compliant with medication protocol. Medications are reported to be effective.   The patient was monitored remotely for blood pressure, weight, and activity level for a period of 15 minutes.   This patient's physical needs include: physical healthcare and resources for disability needs. Patient has recent complaints of increased bilateral knee pain. Patient reports having increased trouble walking with his increased pain. Patient encouraged to call with further issues or needs.   Patient's mental support needs include: increased support and coordination of community providers. Patient needing additional support and encouragement with his wife being in the nursing home with dementia, and with this Covid-19. Patient is unable to see his wife in-person, and this has had a huge impact on his mood and anxiety/depression.   The patient's cognitive support needs include: increased support, coordination of community providers, and health care. Helping patient to coordinate and keep up with all outside provider visits.   The patient's psychosocial support needs include:, continued support, coordination of community providers, and no opportunity for leisure activities. Patient has been more depressed recently with his wife being in the nursing home, and with his increased knee pain. Patient has been thankful for the check-in calls..   This patient's functional needs include: needs assistance for ADLs. With increased knee pain, and trouble getting around at times, patient has been encouraged to call our office if he feels like he needs an order for assistive devices to help with mobility; patient verbalized understanding.   This patient's environmental needs are N/A.           Assessment  · Chronic Care Management  (CCM)     V68.89/Z02.89  · Atrial Fibrillation     427.31/I48.91  · Hypertension     401.9/I10  · Anxiety and depression       Anxiety disorder, unspecified     300.00/F41.9  Major depressive disorder, single episode, unspecified     300.00/F32.9      Plan  · Instructions  o Patient's Health Care Goals: Stay Healthy  o Provider's Health Care Goals: Medication compliance; less ER visits  o Patient was provided an electronic copy of care plan  o CCM services were explained and offered and patient has accepted these services.  o Patient has given their written consent to recieve CCM services and understands that this includes the authorization of electronic communication of medical information with other treating providers.  o Patient understands that they may stop CCM services at any time and these changes will be effective at the end of the calendar month and will effectively revocate the agreement of CCM services.  o Patient understands that only one practioner can furnish and be paid for CCM services during one calendar month. Patient also understands that there may be co-payment or deductible fees in association with CCM services.  o Patient will continue with at least monthly follow-up calls with the Nurse Navigator.  · Associate Tasks  o Task ID [id number] CCM: CCM - July 2020            Electronically Signed by: Ana Lilia Lam RN -Author on July 29, 2020 11:49:13 AM

## 2021-05-07 NOTE — PROGRESS NOTES
Progress Note      Patient Name: Edmond Lujan   Patient ID: [id number]   Sex: Male   YOB: 1945    Primary Care Provider: Ambrocio Guy MD   Referring Provider: Ambrocio Guy MD    Visit Date: February 3, 2020    Provider: Iliana Montes DO   Location: [facility]   Location Address: [address] [place] [address] [address] [place]  [address]   Location Phone: [phone]          Chief Complaint     F/U HOSPITAL DISCHARGE       History Of Present Illness  Edmond Lujan is a 74 year old Other Race,  or  male who presents for evaluation and treatment of:      A.) FOLLOW-UP HOSPITAL DISCHARGE : The patient presents for a follow up visit, after being evaluated in the ED at [facility] for chest pain. A. fib with RVR was noted in the ER. Patient's dose of Cardizem was increased to 360 mg. He was advised to follow-up with cardiology ([facility] - Dr. Mark Villalobos MD). He reports a visit with cardiology before this visit. Today, he reports that he is doing fairly well. He denies any chest pain or difficulty in breathing. He denies any palpitations. He notes that he now uses O2 at home when sleeping. History of SHERRILL, he admits that he has stopped using his CPAP because 'it disturbs my wife.' He reports being placed on the O2 after being evaluated at [facility].       Past Medical History  Disease Name Date Onset Notes   Atrial Fibrillation --  --    Benign prostatic hypertrophy (BPH) with incomplete bladder emptying --  --    Chronic kidney disease --  --    COPD (chronic obstructive pulmonary disease) --  --    Dermatitis --  --    GERD (gastroesophageal reflux disease) --  --    Gout, arthritis --  --    High triglycerides --  --    Hyperlipidemia --  --    Hypertension --  --    Nonischemic cardiomyopathy --  --    SHERRILL on CPAP --  --    Osteoarthritis --  --    Renal insufficiency --  --    Thrombocytopenia --  --          Past Surgical History  Procedure Name  Date Notes   Hip Surgery --  --    Prostate Surgery --  --          Medication List  Name Date Started Instructions   allopurinol 100 mg oral tablet 10/02/2019 take 1 tablet (100 mg) by oral route once daily for 90 days   Anoro Ellipta 62.5-25 mcg/actuation inhalation blister with device 10/02/2019 inhale 1 puff by inhalation route once daily at the same time each day for 90 days   atorvastatin 40 mg oral tablet 10/02/2019 take 1 tablet (40 mg) by oral route once daily at bedtime for 90 days   cane miscellaneous device 01/06/2020 use as directed for 30 days   Cartia  mg oral capsule,extended release 24hr 10/02/2019 take 1 capsule (240 mg) by oral route once daily for 90 days   citalopram 10 mg oral tablet  take 1 tablet (10 mg) by oral route once daily   diltiazem HCl 360 mg oral capsule,extended release 24 hr  take 1 capsule (360 mg) by oral route once daily   Eliquis 2.5 mg oral tablet  take 1 tablet (2.5 mg) by oral route 2 times per day   fluticasone propionate 50 mcg/actuation nasal spray,suspension 09/16/2019 spray 1 spray (50 mcg) in each nostril by intranasal route once daily for 30 days   furosemide 80 mg oral tablet 10/02/2019 take 1 tablet (80 mg) by oral route once daily for 90 days   hydralazine 10 mg oral tablet 10/02/2019 take 1 tablet (10 mg) by oral route 4 times per day with food for 90 days   Incruse Ellipta 62.5 mcg/actuation inhalation blister with device 01/06/2020 inhale 1 puff daily   metoprolol tartrate 25 mg oral tablet  take 1 tablet (25 mg) by oral route 2 times per day   omega-3 acid ethyl esters 1 gram oral capsule 01/06/2020 TAKE TWO CAPSULES BY MOUTH TWICE A DAY   potassium chloride 10 mEq oral capsule, extended release 10/02/2019 take 1 capsule (10 meq) by oral route 2 times per day with food for 90 days   ProAir HFA 90 mcg/actuation inhalation HFA aerosol inhaler 01/06/2020 inhale 1 - 2 puffs (90 - 180 mcg) by inhalation route every 4-6 hours as needed for 90 days   tamsulosin  "0.4 mg oral capsule 10/02/2019 take 1 capsule (0.4 mg) by oral route once daily 1/2 hour following the same meal each day for 90 days   Zyrtec 10 mg oral tablet 10/02/2019 take 1 tablet at bedtime         Allergy List  Allergen Name Date Reaction Notes   NO KNOWN DRUG ALLERGIES --  --  --        Allergies Reconciled  Social History  Finding Status Start/Stop Quantity Notes   Tobacco Former --/-- --  --          Immunizations  NameDate Admin Mfg Trade Name Lot Number Route Inj VIS Given VIS Publication   Uwluzqfwr83/05/2019 PMC FLUZONE-HIGH DOSE [id number] IM LA 12/05/2019    Comments: NDC 25247-500-95   InfluenzaUnknown UNK Unknown TradeName [id number] NE NE 11/29/2017 08/07/2015   Comments: this is recorded out of the old system   Ivpjnqayw9495/05/2019 MSD PNEUMOVAX 23 [id number] IM RD 12/05/2019    Comments: NDC 2362-4859-84   TdapUnknown PMC ADACEL [id number] NE NE 11/29/2017 02/24/2015   Comments: this is recorded out of old system/ ES         Review of Systems  · Constitutional  o Denies  o : fever, chills  · Eyes  o Denies  o : discharge from eye, blurred or double vision  · HENT  o Denies  o : lightheadedness, nasal congestion  · Cardiovascular  o Denies  o : chest pain, rapid heart rate, syncope  · Respiratory  o Denies  o : shortness of breath, wheezing, cough  · Gastrointestinal  o Denies  o : nausea, vomiting, diarrhea  · Integument  o Denies  o : rash, skin dryness, new skin lesions  · Neurologic  o Denies  o : altered mental status, tingling or numbness, speech difficulties  · Psychiatric  o Denies  o : hallucinations, delusions, feeling confused  · Heme-Lymph  o Denies  o : easy bleeding, petechiae, purpura      Vitals  Date Time BP Position Site L\R Cuff Size HR RR TEMP (F) WT  HT  BMI kg/m2 BSA m2 O2 Sat HC       02/03/2020 02:52 /90 Sitting    94 - R  97.4 207lbs 6oz 5'  7\" 32.48 2.11 99 %          Physical Examination  · Constitutional  o Appearance  o : well developed, well-nourished, in no acute " distress  · Head and Face  o HEENT  o : unremarkable  · Eyes  o Conjunctivae  o : conjunctivae normal  · Neck  o Inspection/Palpation  o : supple  · Respiratory  o Respiratory Effort  o : breathing unlabored  o Auscultation of Lungs  o : clear to ascultation  · Cardiovascular  o Heart  o :   § Auscultation of Heart  § : irregularly irregular  o Peripheral Vascular System  o :   § Extremities  § : mild lower extremity edema present   · Skin and Subcutaneous Tissue  o General Inspection  o : no rashes appreciated  · Psychiatric  o Mood and Affect  o : mood normal, affect appropriate              Assessment  · Atrial fibrillation, persistent     427.31/I48.19    A.) We will request the most recent note from cardiology; patient will continue on Cardizem 360 mg; unremarkable blood pressure noted during this visit; no labs indicated today; patient advised to schedule Medicare wellness visit to follow-up in 4 to 6-week.       Problems Reconciled  Plan  · Orders  o ACO-39: Current medications updated and reviewed () - - 02/03/2020  · Medications  o Medications have been Reconciled  o Transition of Care or Provider Policy  · Instructions  o Patient was educated/instructed on their diagnosis, treatment and medications prior to discharge from the clinic today.            Electronically Signed by: Iliana Montes DO -Author on February 3, 2020 03:16:31 PM

## 2021-05-07 NOTE — OUTREACH NOTE
Quick Note      Patient Name: Edmond Lujan   Patient ID: [id number]   Sex: Male   YOB: 1945    Primary Care Provider: Ambrocio Guy MD   Referring Provider: Ambrocio Guy MD    Visit Date: February 26, 2020    Provider: Ambrocio Guy MD   Location: [facility]   Location Address: [address] [place] [address]   [place]  [address]   Location Phone: [phone]          History Of Present Illness  CCM Comprehensive Care Plan    This Chronic Medical Management Care Plan for Edmond Lujan, 74 year old Other Race,  or  male, has been monitored and managed, reviewed, and and a new plan of care implemented for the month of February. A cumulative time of 21 minutes was spent on this patient record, including face to face with provider, phone call with patient, electronic communication with other providers, and chart review.   Regarding the patient's diagnoses Atrial Fibrillation, Benign prostatic hypertrophy (BPH) with incomplete bladder emptying, Chronic kidney disease, GERD (gastroesophageal reflux disease), High triglycerides, Hyperlipidemia, Hypertension, Osteoarthritis, and Renal insufficiency, the following items were adressed: medical records, medications, and transitions to medical care and any changes can be found within the plan section of the note. A detailed listing of time spent for chronic care management has been scanned into the patient's electronic record. Current medications include: allopurinol 100 mg oral tablet, Anoro Ellipta 62.5-25 mcg/actuation inhalation blister with device, atorvastatin 40 mg oral tablet, cane miscellaneous device, Cartia  mg oral capsule,extended release 24hr, citalopram 10 mg oral tablet, diltiazem HCl 360 mg oral capsule,extended release 24 hr, Eliquis 2.5 mg oral tablet, fluticasone propionate 50 mcg/actuation nasal spray,suspension, furosemide 80 mg oral tablet, hydralazine 10 mg oral tablet, Incruse Ellipta 62.5  mcg/actuation inhalation blister with device, metoprolol tartrate 25 mg oral tablet, omega-3 acid ethyl esters 1 gram oral capsule, potassium chloride 10 mEq oral capsule, extended release, ProAir HFA 90 mcg/actuation inhalation HFA aerosol inhaler, tamsulosin 0.4 mg oral capsule, and Zyrtec 10 mg oral tablet and the patient is reported to be compliant with medication protocol. Medications are reported to be effective.   The patient was monitored remotely for blood pressure and activity level for a period of 10 minutes.   This patient's physical needs include: health care coverage and medication education. Patient educated on his new medications, and the importance of taking his medications as prescribed..   Patient's mental support needs include: increased support and coordination of community providers. Patient's wife suffers from multiple health problems including early onset dementia, so patient supported by us to help stay healthy, compliant and well in order to care for his wife, and to be able to visit her in the nursing home..   The patient's cognitive support needs include: increased support, coordination of community providers, and health care. Supporting patient through referral management, and obtaining consult and ER notes from recent visits. Also supporting patient in the dynamic change of his wife being in long term care.   The patient's psychosocial support needs include:, need for increased support and coordination of community providers. Encouraging patient to stay compliant with mediations and follow up appointments in order to be as well as possible in order to visit wife as much as possible.   This patient's functional needs are N/A.   This patient's environmental needs are N/A.           Assessment  · Chronic Care Management (CCM)     V68.89/Z02.89  · Atrial Fibrillation     427.31/I48.91  · Chronic kidney disease     585.9/N18.9  · High  triglycerides     272.1/E78.1  · Hyperlipidemia     272.4/E78.5  · Hypertension     401.9/I10      Plan  · Orders  o Chronic care management services, at least 20 minutes of clinica (03632) - V68.89/Z02.89 - 02/26/2020  · Instructions  o Patient's Health Care Goals: Management of A-fib and staying healthy in order to provider for wife in LTC.  o Provider's Health Care Goals: Medication compliance and management of consult notes.  o Patient was provided an electronic copy of care plan  o CCM services were explained and offered and patient has accepted these services.  o Patient has given their written consent to recieve CCM services and understands that this includes the authorization of electronic communication of medical information with other treating providers.  o Patient understands that they may stop CCM services at any time and these changes will be effective at the end of the calendar month and will effectively revocate the agreement of CCM services.  o Patient understands that only one practioner can furnish and be paid for CCM services during one calendar month. Patient also understands that there may be co-payment or deductible fees in association with CCM services.  o Patient will continue with at least monthly follow-up calls with the Nurse Navigator.  · Associate Tasks  o Task ID [id number] CCM: CCM - February 2020            Electronically Signed by: Ana Lilia Lam RN -Author on February 26, 2020 02:55:50 PM

## 2021-05-07 NOTE — PROGRESS NOTES
Progress Note      Patient Name: Edmond Lujan   Patient ID: [id number]   Sex: Male   YOB: 1945    Primary Care Provider: Ambrocio Guy MD   Referring Provider: Ambrocio Guy MD    Visit Date: September 17, 2020    Provider: Ambrocio Guy MD   Location: [facility]   Location Address: [address] [place] [address]   [place]  [address]   Location Phone: [phone]          Chief Complaint     Trouble sleeping, heart has slowed down intermittently       History Of Present Illness  Edmond Lujan is a 74 year old Other Race,  or  male who presents for evaluation and treatment of:      pt has been having trouble sleeping- will be trying melatonin and chamomile- will see how this works- pt says stress of world getting to him    pt has seen cardiology and needs stent- he will be getting eventually- pt not had any recent tachycardia, per pt- pt asked to continue seeing cardiology  HTN- controlled on meds  hyperlipidemia- unknown control- need labs to assess  pt tolerating meds well  copd- pt wants new pulmonology referral to treat his copd          EKG: NSR, no changes seen       Past Medical History  Disease Name Date Onset Notes   Anxiety and depression --  --    Atrial Fibrillation --  --    Benign prostatic hypertrophy (BPH) with incomplete bladder emptying --  --    Chronic kidney disease --  --    COPD (chronic obstructive pulmonary disease) --  --    Dermatitis --  --    GERD (gastroesophageal reflux disease) --  --    Gout, arthritis --  --    High triglycerides --  --    Hyperlipidemia --  --    Hypertension --  --    Nonischemic cardiomyopathy --  --    SHERRILL on CPAP --  --    Osteoarthritis --  --    Renal insufficiency --  --    Thrombocytopenia --  --          Past Surgical History  Procedure Name Date Notes   Hip Surgery --  --    Prostate Surgery --  --          Medication List  Name Date Started Instructions   allopurinol 100 mg oral tablet 05/14/2020 take 1 tablet  (100 mg) by oral route once daily for 90 days   Anoro Ellipta 62.5-25 mcg/actuation inhalation blister with device 10/02/2019 inhale 1 puff by inhalation route once daily at the same time each day for 90 days   atorvastatin 40 mg oral tablet 10/02/2019 take 1 tablet (40 mg) by oral route once daily at bedtime for 90 days   cane miscellaneous device 01/06/2020 use as directed for 30 days   citalopram 40 mg oral tablet 03/31/2020 take 1 tablet (40 mg) by oral route once daily for 90 days   Eliquis 2.5 mg oral tablet  take 1 tablet (2.5 mg) by oral route 2 times per day   Eliquis 5 mg oral tablet 07/20/2020 TAKE 1 TABLET TWICE A DAY   fluticasone propionate 50 mcg/actuation nasal spray,suspension 09/16/2019 spray 1 spray (50 mcg) in each nostril by intranasal route once daily for 30 days   furosemide 80 mg oral tablet 05/19/2020 take 1 tablet (80 mg) by oral route once daily for 90 days   Incruse Ellipta 62.5 mcg/actuation inhalation blister with device 01/06/2020 inhale 1 puff daily   omega-3 acid ethyl esters 1 gram oral capsule 01/06/2020 TAKE TWO CAPSULES BY MOUTH TWICE A DAY   potassium chloride 10 mEq oral capsule, extended release 07/20/2020 TAKE 1 CAPSULE TWICE A DAY WITH FOOD   ProAir HFA 90 mcg/actuation inhalation HFA aerosol inhaler 01/06/2020 inhale 1 - 2 puffs (90 - 180 mcg) by inhalation route every 4-6 hours as needed for 90 days   sodium bicarbonate 650 mg oral tablet  take 1 tablet by oral route 2 times a day for 20 days   tamsulosin 0.4 mg oral capsule 07/20/2020 TAKE 1 CAPSULE DAILY 1/2 HOUR FOLLOWING THE SAME MEAL EACH DAY   verapamil 180 mg oral tablet extended release 05/19/2020 take 1 tablet (180 mg) by oral route once daily with food for 90 days   Zyrtec 10 mg oral tablet 10/02/2019 take 1 tablet at bedtime         Allergy List  Allergen Name Date Reaction Notes   NO KNOWN DRUG ALLERGIES --  --  --          Social History  Finding Status Start/Stop Quantity Notes   Tobacco Former --/-- --  --           Immunizations  NameDate Admin Mfg Trade Name Lot Number Route Inj VIS Given VIS Publication   HepB03/02/2020 SKB ENGERIX-B-ADULT [id number] IM  03/02/2020    Comments: NDC#[id number]   Yqkojlxzc18/05/2019 PMC FLUZONE-HIGH DOSE [id number] IM [place] 12/05/2019    Comments: NDC 74313-861-43   InfluenzaUnknown UNK Unknown TradeName [id number] NE NE 11/29/2017 08/07/2015   Comments: this is recorded out of the old system   Dmplfojss8868/05/2019 MSD PNEUMOVAX 23 [id number] IM RD 12/05/2019    Comments: NDC 2196-7771-98   TdapUnknown PMC ADACEL [id number] NE NE 11/29/2017 02/24/2015   Comments: this is recorded out of old system/ ES         Review of Systems  · Constitutional  o Denies  o : fatigue, fever  · Cardiovascular  o Denies  o : chest pain, irregular heart beats, rapid heart rate, syncope, palpitations  · Respiratory  o Denies  o : shortness of breath, cough  · Gastrointestinal  o Denies  o : nausea, vomiting, diarrhea  · Psychiatric  o Admits  o : difficulty sleeping  o Denies  o : anxiety, depression, suicidal ideation, homicidal ideation      Vitals  Date Time BP Position Site L\R Cuff Size HR RR TEMP (F) WT  HT  BMI kg/m2 BSA m2 O2 Sat        09/17/2020 03:16 /90 Sitting    100 - R  98.4 212lbs 0oz    96 %          Physical Examination  · Constitutional  o Appearance  o : well developed, well-nourished, in no acute distress  · Respiratory  o Respiratory Effort  o : breathing unlabored  o Auscultation of Lungs  o : clear to ascultation  · Cardiovascular  o Heart  o :   § Auscultation of Heart  § : regular rate and rhythm  o Peripheral Vascular System  o :   § Extremities  § : no edema  · Gastrointestinal  o Abdomen  o : soft, non-tender, non-distended, + bowel sounds, no hepatosplenomegaly, no masses palpated  · Musculoskeletal  o General  o :   § General Musculoskeletal  § : No joint swelling or deformity., Muscle tone, strength, and development grossly normal.  · Neurologic  o Gait and Station  o :   § Gait  Screening  § : normal gait  · Psychiatric  o Mood and Affect  o : mood normal, affect appropriate          Assessment  · COPD (chronic obstructive pulmonary disease)     496/J44.9  · Essential hypertension     401.9/I10  · Hyperlipidemia     272.4/E78.5  · Sleep apnea     780.57/G47.30    Problems Reconciled  Plan  · Orders  o CBC with Auto Diff [facility] ([id number]) - 401.9/I10, 272.4/E78.5 - 09/17/2020  o CMP [facility] (58473) - 401.9/I10, 272.4/E78.5 - 09/17/2020  o Lipid Panel [facility] (49391) - 401.9/I10, 272.4/E78.5 - 09/17/2020  o Thyroid Profile (THYII) - 401.9/I10, 272.4/E78.5 - 09/17/2020  o ACO-39: Current medications updated and reviewed () - - 09/17/2020  o Pulmonology Consultation (26) - 496/J44.9, 780.57/G47.30 - 09/17/2020   pt needs new pulmonologist- last pulmonologist scolded him because he was having to take care of his wife with dementia- need eval and tx  o EKG (Recording and Interpretation) [facility] (Done and read at [facility]) ([id number]) - 401.9/I10 - 09/18/2020  · Medications  o Medications have been Reconciled  o Transition of Care or Provider Policy  · Instructions  o Advised that cheeses and other sources of dairy fats, animal fats, fast food, and the extras (candy, pasteries, pies, doughnuts and cookies) all contain LDL raising nutrients. Advised to increase fruits, vegetables, whole grains, and to monitor portion sizes.   o Patient was educated/instructed on their diagnosis, treatment and medications prior to discharge from the clinic today.            Electronically Signed by: Ambrocio Guy MD -Author on September 18, 2020 02:30:28 PM

## 2021-05-07 NOTE — OUTREACH NOTE
Quick Note      Patient Name: Edmond Lujan   Patient ID: [id number]   Sex: Male   YOB: 1945    Primary Care Provider: Ambrocio Guy MD   Referring Provider: Ambrocio Guy MD    Visit Date: March 30, 2020    Provider: Ambrocio Guy MD   Location: [facility]   Location Address: [address] [place] [address] [place]  [address]   Location Phone: [phone]          History Of Present Illness     [facility]     TaskID: [id number]    Task Subject: [facility] - March 2020    Task Comments:    Date: Mar 20 2020  2:12PM     Creator: vikki  Patient called office today, asking if he could resume his metoprolol that he was previously on for BP. This medication was discontinued when he was discharged from [facility], and replaced with diltilizem 360mg. [facility], but patient is having car trouble, and cannot get there to get the medication without a ride. I spoke to patient about [facility], and the fact that they usually deliver. That could be an option if the patient cannot find a friend to take him [facility]. patient agreed. (5 minutes)    Date: Mar 19 2020 10:08AM     Creator: vikki  Extensive chart review: 03/05/2020- patient was seen at [facility] ER for chest pain and Afib; he was transferred to [facility] in [facility]. Patient was discharged from [facility] for a-fib and an acute kidney injury on 03/14/2020. Discharge summary scanned into chart. Records indicate that patient REFUSED cardiac cath, while in the hospital, but patient is reporting to providers that the catherization was performed, but no stent was placed. There is no documenation to prove that procedure occured in the [facility] system. Patient seen at [facility] on 03/16 for Anxiety and depression issues. His depression is stemming from the corona virus and the fact that he cannot visit his wife in [place] in Long term care related to visitor restrictions. Patient seen in the [facility] ER on 03/18/2020 for chest pain.  Patient advised to follow-up with his cardiologist in the next few days. patient was discharged home. (15 mintues)    Date: Mar  2 2020  9:49AM     Creator: vikki  Chart review: Preparation for patients Medicare Annual Wellness Exam, no colonoscopy on file for patient. Will speak with patient today regarding colon cancer screening. Patient is also due for fasting labs, to include a lipid panel. Patient also needing a depression screening. After AWV, I will obtain patients c-scope reports. (10 minutes)             Plan  · Medications  o Medications have been Reconciled  o Transition of Care or Provider Policy            Electronically Signed by: Ana Lilia Lam RN -Author on March 30, 2020 02:21:26 PM

## 2021-05-07 NOTE — OUTREACH NOTE
"   Quick Note      Patient Name: Edmond Lujan   Patient ID: [id number]   Sex: Male   YOB: 1945    Primary Care Provider: Ambrocio Guy MD   Referring Provider: Ambrocio Guy MD    Visit Date: November 30, 2020    Provider: Ambrocio Guy MD   Location: [facility]   Location Address: [address] [place] [address]   [place]  [address]   Location Phone: [phone]          History Of Present Illness     [facility]     TaskID: [id number]    Task Subject: CCM Note November 2020    Task Comments:    Date: Nov 17 2020  3:21PM     Creator: jean  I made contact with Ms. Kerri Trujillo and she stated that [facility] does take [employer] insurance. I called the patient back and shared that [facility] does take the insurance he has but that [facility] is not affileated with the LTCF his wife is in and he may need to call and verify with them as well the patient verbalized understanding. (total time 11 mins)    Date: Nov 17 2020  3:19PM     Creator: jean  ( Continued ) Was still in the LTCF and she was doing well. He stated he had changed insureance to [employer] and wanted to know if [facility] excepted the ins. I told the patient I was not sure and that I would check with bill and call him back and he agreed. The patient denied any issues with his kidneys. Meds were discussed and reconciled need for refills was denied. The patient had no further needs at this time. ( cahrt review 5 min call 19 min )    Date: Nov 17 2020  3:11PM     Creator: jean  Per chart review the patient has had the followin labs : Amylase , Lipase Panel Lipase 164 amylase 129. Per chart the patient is aware of his results. Patient had follow up visit with pcp on 10- and was asked to follow up PRN. I called [facility] patient and he stated he was feeling ok he stated just the usual \"old age' pains. He stated he had recently seen His MD at [facility] in [place]  and had been given a good report. He denied any SOA or other breathing issues. he " stated he only had some seasonal alegys for which he simply used a saline nasel spray to treat. H stated that since the weather had changed that his joint pain had increased but not out of the ordenary . he stated his BP and HR were both WNL per patient. He stated that he does stay inside a lot because of the covid 19 issue. he said that if he does get out that he always wears a mask . He denies any issues with anxiety or depression and states he reads the Bible and talks with friends to help stay focused. He stated that his wife                   Electronically Signed by: Ana Lilia Lam RN -Author on November 30, 2020 10:48:21 AM

## 2021-05-07 NOTE — OUTREACH NOTE
Quick Note      Patient Name: Edmond Lujan   Patient ID: [id number]   Sex: Male   YOB: 1945    Primary Care Provider: Ambrocio Guy MD   Referring Provider: Ambrocio Guy MD    Visit Date: September 26, 2019    Provider: Ambrocio Guy MD   Location: [facility]   Location Address: [address] [place] [address]   [place]  [address]   Location Phone: [phone]          History Of Present Illness  CCM Comprehensive Care Plan    This Chronic Medical Management Care Plan for Edmond Lujan, 73 year old Other Race,  or  male, has been monitored and managed, and a new plan of care implemented, and established for the month of September. A cumulative time of 22 minutes was spent on this patient record, including face to face with provider, phone call with patient, phone call with pharmacist, electronic communication with pharmacist, and chart review.   Regarding the patient's diagnoses Atrial Fibrillation, Benign prostatic hypertrophy (BPH) with incomplete bladder emptying, Chronic kidney disease, Decreased GFR, GERD (gastroesophageal reflux disease), High triglycerides, Hyperlipidemia, Hypertension, Osteoarthritis, and Renal insufficiency, the following items were adressed: medical records and medications and any changes can be found within the plan section of the note. A detailed listing of time spent for chronic care management has been scanned into the patient's electronic record. Current medications include: allopurinol 100 mg oral tablet, Anoro Ellipta 62.5-25 mcg/actuation inhalation blister with device, atorvastatin 40 mg oral tablet, Cartia  mg oral capsule,extended release 24hr, Eliquis 5 mg oral tablet, fluticasone propionate 50 mcg/actuation nasal spray,suspension, furosemide 80 mg oral tablet, hydralazine 10 mg oral tablet, Incruse Ellipta 62.5 mcg/actuation inhalation blister with device, omega-3 acid ethyl esters 1 gram oral capsule, potassium  chloride 10 mEq oral capsule, extended release, ProAir HFA 90 mcg/actuation inhalation HFA aerosol inhaler, Protonix 40 mg oral tablet,delayed release (DR/EC), tamsulosin 0.4 mg oral capsule, and Zyrtec 10 mg oral tablet and the patient is reported to be compliant with medication protocol. Medications are reported to be effective.   The patient was monitored remotely for activity level for a period of 3 minutes.   This patient's physical needs include: medication education. PT taking new dose of Eliquis this month.   Patient's mental support needs are currently being met.   The patient's cognitive support needs are currently being met.   The patient's psychosocial support needs are N/A,   This patient's functional needs include: health care coverage and medication education. PT wants medication sent to mail-in pharmacy, NN works with PCP.   This patient's environmental needs are N/A.           Assessment  · Chronic Care Management (CCM)     V68.89/Z02.89  · Decreased GFR     794.4/R94.4  · Renal insufficiency     593.9/N28.9  · Atrial Fibrillation     427.31/I48.91  · Benign prostatic hypertrophy (BPH) with incomplete bladder emptying     600.01/N40.1  · Chronic kidney disease     585.9/N18.9  · GERD (gastroesophageal reflux disease)     530.81/K21.9  · High triglycerides     272.1/E78.1  · Hyperlipidemia     272.4/E78.5  · Hypertension     401.9/I10  · Osteoarthritis     715.90/M19.90      Plan  · Medications  o Medications have been Reconciled  o Transition of Care or Provider Policy  · Instructions  o Patient's Health Care Goals: Better control of medication, understanding why he takes them  o Provider's Health Care Goals: ____________________  o Patient was provided an electronic copy of care plan  o CCM services were explained and offered and patient has accepted these services.  o Patient has given their written consent to recieve CCM services and understands that this includes the authorization of electronic  communication of medical information with other treating providers.  o Patient understands that they may stop CCM services at any time and these changes will be effective at the end of the calendar month and will effectively revocate the agreement of CCM services.  o Patient understands that only one practioner can furnish and be paid for CCM services during one calendar month. Patient also understands that there may be co-payment or deductible fees in association with CCM services.  o Patient will continue with at least monthly follow-up calls with the Nurse Navigator.  · Associate Tasks  o Task ID [id number] CCM: CCM - September 2019            Electronically Signed by: Yosef Branham RN -Author on September 26, 2019 02:10:31 PM

## 2021-05-07 NOTE — PROGRESS NOTES
Progress Note      Patient Name: Edmond Lujan   Patient ID: [id number]   Sex: Male   YOB: 1945    Primary Care Provider: Ambrocio Guy MD   Referring Provider: Ambrocio Guy MD    Visit Date: March 31, 2020    Provider: Ambrocio Guy MD   Location: [facility]   Location Address: [address] [place] [address] [place]  [address]   Location Phone: [phone]          Chief Complaint     Follow up on depression.       History Of Present Illness  Edmond Lujan is a 74 year old Other Race,  or  male who presents for evaluation and treatment of:      pt following up for depression- depression med helping but needs increased dose- no SI or HI- pt tolerating med well       Past Medical History  Disease Name Date Onset Notes   Atrial Fibrillation --  --    Benign prostatic hypertrophy (BPH) with incomplete bladder emptying --  --    Chronic kidney disease --  --    COPD (chronic obstructive pulmonary disease) --  --    Dermatitis --  --    GERD (gastroesophageal reflux disease) --  --    Gout, arthritis --  --    High triglycerides --  --    Hyperlipidemia --  --    Hypertension --  --    Nonischemic cardiomyopathy --  --    SHERRILL on CPAP --  --    Osteoarthritis --  --    Renal insufficiency --  --    Thrombocytopenia --  --          Past Surgical History  Procedure Name Date Notes   Hip Surgery --  --    Prostate Surgery --  --          Medication List  Name Date Started Instructions   allopurinol 100 mg oral tablet 10/02/2019 take 1 tablet (100 mg) by oral route once daily for 90 days   Anoro Ellipta 62.5-25 mcg/actuation inhalation blister with device 10/02/2019 inhale 1 puff by inhalation route once daily at the same time each day for 90 days   atorvastatin 40 mg oral tablet 10/02/2019 take 1 tablet (40 mg) by oral route once daily at bedtime for 90 days   cane miscellaneous device 01/06/2020 use as directed for 30 days   Cartia  mg oral capsule,extended  release 24hr 10/02/2019 take 1 capsule (240 mg) by oral route once daily for 90 days   citalopram 40 mg oral tablet 03/31/2020 take 1 tablet (40 mg) by oral route once daily for 90 days   diltiazem HCl 360 mg oral capsule,extended release 24 hr  take 1 capsule (360 mg) by oral route once daily   Eliquis 2.5 mg oral tablet  take 1 tablet (2.5 mg) by oral route 2 times per day   fluticasone propionate 50 mcg/actuation nasal spray,suspension 09/16/2019 spray 1 spray (50 mcg) in each nostril by intranasal route once daily for 30 days   furosemide 80 mg oral tablet 10/02/2019 take 1 tablet (80 mg) by oral route once daily for 90 days   Incruse Ellipta 62.5 mcg/actuation inhalation blister with device 01/06/2020 inhale 1 puff daily   omega-3 acid ethyl esters 1 gram oral capsule 01/06/2020 TAKE TWO CAPSULES BY MOUTH TWICE A DAY   potassium chloride 10 mEq oral capsule, extended release 10/02/2019 take 1 capsule (10 meq) by oral route 2 times per day with food for 90 days   ProAir HFA 90 mcg/actuation inhalation HFA aerosol inhaler 01/06/2020 inhale 1 - 2 puffs (90 - 180 mcg) by inhalation route every 4-6 hours as needed for 90 days   sodium bicarbonate 650 mg oral tablet  take 1 tablet by oral route 2 times a day for 20 days   tamsulosin 0.4 mg oral capsule 10/02/2019 take 1 capsule (0.4 mg) by oral route once daily 1/2 hour following the same meal each day for 90 days   Zyrtec 10 mg oral tablet 10/02/2019 take 1 tablet at bedtime         Allergy List  Allergen Name Date Reaction Notes   NO KNOWN DRUG ALLERGIES --  --  --          Social History  Finding Status Start/Stop Quantity Notes   Tobacco Former --/-- --  --          Immunizations  NameDate Admin Mfg Trade Name Lot Number Route Inj VIS Given VIS Publication   HepB03/02/2020 SKB ENGERIX-B-ADULT 2573G IM  03/02/2020    Comments: NDC#[id number]   Uvfujqfnf81/05/2019 PMC FLUZONE-HIGH DOSE [id number] IM LA 12/05/2019    Comments: NDC 83926-890-21   InfluenzaUnknown UNK  Unknown TradeName [id number] NE NE 11/29/2017 08/07/2015   Comments: this is recorded out of the old system   Kjwttgkbz3440/05/2019 MSD PNEUMOVAX 23 [id number] IM RD 12/05/2019    Comments: NDC 9696-0974-70   TdapUnknown [facility] ADACEL [id number] NE NE 11/29/2017 02/24/2015   Comments: this is recorded out of old system/ ES         Review of Systems  · Constitutional  o Denies  o : fatigue, fever  · Cardiovascular  o Denies  o : chest pain, palpitations  · Respiratory  o Denies  o : shortness of breath, cough  · Gastrointestinal  o Denies  o : nausea, vomiting, diarrhea  · Psychiatric  o Admits  o : depression, difficulty sleeping  o Denies  o : anxiety, suicidal ideation, homicidal ideation      Vitals  Date Time BP Position Site L\R Cuff Size HR RR TEMP (F) WT  HT  BMI kg/m2 BSA m2 O2 Sat HC       03/31/2020 12:31 /105 Sitting    67 - R  97.3 204lbs 16oz    98 %    03/31/2020 01:14 /85 Sitting                     Physical Examination  · Constitutional  o Appearance  o : well developed, well-nourished, in no acute distress  · Respiratory  o Respiratory Effort  o : breathing unlabored  o Auscultation of Lungs  o : clear to ascultation  · Cardiovascular  o Heart  o :   § Auscultation of Heart  § : regular rate and rhythm  o Peripheral Vascular System  o :   § Extremities  § : no edema  · Gastrointestinal  o Abdomen  o : soft, non-tender, non-distended, + bowel sounds, no hepatosplenomegaly, no masses palpated  · Musculoskeletal  o General  o :   § General Musculoskeletal  § : No joint swelling or deformity., Muscle tone, strength, and development grossly normal.  · Neurologic  o Gait and Station  o :   § Gait Screening  § : normal gait  · Psychiatric  o Mood and Affect  o : mood normal, affect appropriate          Assessment  · Anxiety and depression       Anxiety disorder, unspecified     300.00/F41.9  Major depressive disorder, single episode, unspecified     300.00/F32.9    Problems  Reconciled  Plan  · Orders  o ACO-39: Current medications updated and reviewed () - - 03/31/2020  · Medications  o citalopram 40 mg oral tablet   SIG: take 1 tablet (40 mg) by oral route once daily for 90 days   DISP: (90) tablets with 1 refills  Adjusted on 03/31/2020     o Medications have been Reconciled  o Transition of Care or Provider Policy  · Instructions  o Patient was educated/instructed on their diagnosis, treatment and medications prior to discharge from the clinic today.            Electronically Signed by: Ambrocio Guy MD -Author on March 31, 2020 01:15:40 PM

## 2021-05-07 NOTE — PROGRESS NOTES
Progress Note      Patient Name: Edmond Lujan   Patient ID: [id number]   Sex: Male   YOB: 1945        Visit Date: September 5, 2019    Provider: Ambrocio Guy MD   Location: [facility]   Location Address: [address] [place] [address]   [place]  [address]   Location Phone: [phone]          Chief Complaint  · Follow up office visit within 7 calender days of discharge from inpatient status (high complexity).     med refills       History Of Present Illness  FOLLOW UP OFFICE VISIT WITHIN 7 CALENDER DAYS OF INPATIENT STATUS (SEVERE COMPLEXITY)  Edmond Lujan presents to office for follow up post discharge from inpatient status within 7 calender days. Patient was contacted within 2 business days via phone conversation. Documentation of that phone contact is present in the patient's electronic chart. Patient was admitted to an inpatient faciliity on 08/29/2019 and discharged on 09/01/2019 due to: A-fib RVR, atypical chest pain. Pt doing better, no longer having chest pain or shortness of air. Pt has not been able to follow-up with specialist.   Admitting MD: Dr. Trevino   His discharge summary has been reviewed and placed in the patient's electronic chart.   Patient's problem list is: Atrial Fibrillation, Benign prostatic hypertrophy (BPH) with incomplete bladder emptying, and Chronic kidney disease   Patient's medication list has been updated/reconcilled from discharge summary. Medication list is: allopurinol 100 mg oral tablet, Anoro Ellipta 62.5-25 mcg/actuation inhalation blister with device, atorvastatin 40 mg oral tablet, Cartia  mg oral capsule,extended release 24hr, Eliquis 5 mg oral tablet, fluticasone 50 mcg/actuation nasal spray,suspension, furosemide 80 mg oral tablet, hydralazine 10 mg oral tablet, Incruse Ellipta 62.5 mcg/actuation inhalation blister with device, omega-3 acid ethyl esters 1 gram oral capsule, potassium chloride 10 mEq oral capsule, extended  release, ProAir HFA 90 mcg/actuation inhalation HFA aerosol inhaler, Protonix 40 mg oral tablet,delayed release (DR/EC), tamsulosin 0.4 mg oral capsule, and Zyrtec 10 mg oral tablet   Edmond Lujan is a 73 year old Other Race,  or  male who presents for evaluation and treatment of:       Past Medical History  Disease Name Date Onset Notes   Atrial Fibrillation --  --    Benign prostatic hypertrophy (BPH) with incomplete bladder emptying --  --    Chronic kidney disease --  --    Decreased GFR --  --    Dermatitis --  --    Dysuria --  --    GERD (gastroesophageal reflux disease) --  --    Hematuria --  --    High triglycerides --  --    Hyperlipidemia --  --    Hypertension --  --    Osteoarthritis --  --    Renal insufficiency --  --    Thrombocytopenia --  --          Past Surgical History  Procedure Name Date Notes   Hip Surgery --  --    Prostate Surgery --  --          Medication List  Name Date Started Instructions   allopurinol 100 mg oral tablet  take 1 tablet (100 mg) by oral route once daily   Anoro Ellipta 62.5-25 mcg/actuation inhalation blister with device  inhale 1 puff by inhalation route once daily at the same time each day   atorvastatin 40 mg oral tablet  take 1 tablet (40 mg) by oral route once daily at bedtime   Cartia  mg oral capsule,extended release 24hr  take 1 capsule (240 mg) by oral route once daily   Eliquis 5 mg oral tablet 09/05/2019 take 1 tablet by oral route 2 times a day for 30 days   fluticasone 50 mcg/actuation nasal spray,suspension  spray 1 spray (50 mcg) in each nostril by intranasal route once daily   furosemide 80 mg oral tablet  take 1 tablet (80 mg) by oral route once daily   hydralazine 10 mg oral tablet  take 1 tablet (10 mg) by oral route 4 times per day with food   Incruse Ellipta 62.5 mcg/actuation inhalation blister with device  inhale 1 puff daily   omega-3 acid ethyl esters 1 gram oral capsule 11/08/2017 TAKE TWO CAPSULES BY MOUTH TWICE A DAY  "  potassium chloride 10 mEq oral capsule, extended release  take 1 capsule (10 meq) by oral route 2 times per day with food   ProAir HFA 90 mcg/actuation inhalation HFA aerosol inhaler  inhale 1 - 2 puffs (90 - 180 mcg) by inhalation route every 4-6 hours as needed   Protonix 40 mg oral tablet,delayed release (DR/EC)  take 1 tablet (40 mg) by oral route once daily   tamsulosin 0.4 mg oral capsule  take 1 capsule (0.4 mg) by oral route once daily 1/2 hour following the same meal each day   Zyrtec 10 mg oral tablet  take 1 tablet at bedtime         Allergy List  Allergen Name Date Reaction Notes   NO KNOWN DRUG ALLERGIES --  --  --          Social History  Finding Status Start/Stop Quantity Notes   Tobacco Former --/-- --  --          Immunizations  NameDate Admin Mfg Trade Name Lot Number Route Inj VIS Given VIS Publication   InfluenzaUnknown UNK Unknown TradeName [id number] NE NE 11/29/2017 08/07/2015   Comments: this is recorded out of the old system   TdapUnknown [facility] ADACEL [id number] NE NE 11/29/2017 02/24/2015   Comments: this is recorded out of old system/ ES         Review of Systems  · Constitutional  o Denies  o : fever, weight gain, weight loss, malaise/fatigue  · Cardiovascular  o Denies  o : palpitation, chest pain, claudication, lower extremity edema  · Respiratory  o Denies  o : shortness of breath, hemoptysis, wheezing, productive cough  · Gastrointestinal  o Denies  o : nausea, vomiting, diarrhea, constipation, abdominal pain  · Neurologic  o Denies  o : unsteady gait, weakness, dizziness, H/A      Vitals  Date Time BP Position Site L\R Cuff Size HR RR TEMP (F) WT  HT  BMI kg/m2 BSA m2 O2 Sat        09/05/2019 01:18 /72 Sitting    72 - R  97.3 170lbs 2oz 5'  7\" 26.65 1.91 96 %          Physical Examination  · Constitutional  o Appearance  o : well developed, well-nourished, in no acute distress  · Head and Face  o HEENT  o : Unremarkable  · Respiratory  o Respiratory Effort  o : breathing " unlabored  o Auscultation of Lungs  o : clear to ascultation  · Cardiovascular  o Heart  o :   § Auscultation of Heart  § : regular rate and rhythm  o Peripheral Vascular System  o :   § Extremities  § : no edema  · Gastrointestinal  o Abdomen  o : soft, non-tender, non-distended, + bowel sounds, no hepatosplenomegaly, no masses palpated  · Musculoskeletal  o General  o :   § General Musculoskeletal  § : No joint swelling or deformity., Muscle tone, strength, and development grossly normal.  · Neurologic  o Gait and Station  o :   § Gait Screening  § : normal gait  · Psychiatric  o Mood and Affect  o : mood normal, affect appropriate              Assessment  · CHF (congestive heart failure)     428.0/I50.9  · Benign prostatic hypertrophy (BPH) with incomplete bladder emptying     600.01/N40.1  · Chronic kidney disease     585.9/N18.9  · Elevated random blood glucose level     790.29/R73.09  · Shortness of breath     786.05/R06.02  · Screening PSA (prostate specific antigen)     V76.44/Z12.5      Plan  · Orders  o Discharge medications reconciled with the current medication list (1111F) - - 09/05/2019  o CBC with Auto Diff [facility] ([id number]) - 790.29/R73.09, 600.01/N40.1, 585.9/N18.9, 428.0/I50.9 - 09/05/2019  o CMP [facility] ([id number]) - 790.29/R73.09, 600.01/N40.1, 585.9/N18.9, 428.0/I50.9 - 09/05/2019  o ACO-39: Current medications updated and reviewed () - - 09/05/2019  o NEPHROLOGY CONSULTATION (NEPHR) - 585.9/N18.9 - 09/05/2019  o CARDIOLOGY CONSULTATION (CARDI) - 428.0/I50.9 - 09/05/2019  o BNP (brain natriuretic peptide measurement) (75134) - 428.0/I50.9 - 09/05/2019  o PSA Screening, Ultrasensitive, MEDICARE [facility] () - V76.44/Z12.5 - 09/05/2019  · Medications  o Eliquis 5 mg oral tablet   SIG: take 1 tablet by oral route 2 times a day for 30 days   DISP: (60) tablet with 5 refills  Prescribed on 09/05/2019     · Instructions  o Patient discharge summary has been reviewed and placed in patient's electronic medical  record.  o Patient received a phone call from my office within 2 business days of discharge from inpatient status, and documented within the patient's chart.  o Also patient was seen (face to face) for follow up evaluation within 7 calender days of discharge from inpatient status for a high complexity issue.  o Patient was educated on their diagnosis, treatment and any medication changes while being evaluated today.  o Patient was educated/instructed on their diagnosis, treatment and medications prior to discharge from the clinic today.  · Disposition  o Return Visit Request in/on 1 week +/- 2 days (9114).            Electronically Signed by: Ambrocio Guy MD -Author on September 5, 2019 02:18:23 PM

## 2021-05-07 NOTE — PROGRESS NOTES
Progress Note      Patient Name: Edmond Lujan   Patient ID: [id number]   Sex: Male   YOB: 1945    Primary Care Provider: Ambrocio Guy MD   Referring Provider: Ambrocio Guy MD    Visit Date: January 26, 2021    Provider: Ambrocio Guy MD   Location: [facility]   Location Address: [address] [place] [address]   [place]  [address]   Location Phone: [phone]          Chief Complaint     Follow up on gallbladder.       History Of Present Illness  Edmond Lujan is a 75 year old Other Race,  or  male who presents for evaluation and treatment of:      possible gallstone on MRI done by nephrology- see recent notes- pt is seeing GI also for recent pancreatitis- nephrology wants a new ultrasound of gallbladder to see if have stone- if have stone pt will follow-up with GI- if no stone seen on ultrasound then nothing further is needed- pt not symptomatic at this time    reviewed recent records from GI and nephrology    pt urged to see his cardiologist and nephrologist regarding his HTN       Past Medical History  Disease Name Date Onset Notes   Anxiety and depression --  --    Atrial Fibrillation --  --    Benign prostatic hypertrophy (BPH) with incomplete bladder emptying --  --    Chronic kidney disease --  --    COPD (chronic obstructive pulmonary disease) --  --    Dermatitis --  --    GERD (gastroesophageal reflux disease) --  --    Gout, arthritis --  --    High triglycerides --  --    Hyperlipidemia --  --    Hypertension --  --    Nonischemic cardiomyopathy --  --    SHERRILL on CPAP --  --    Osteoarthritis --  --    Renal insufficiency --  --    Thrombocytopenia --  --          Past Surgical History  Procedure Name Date Notes   Hip Surgery --  --    Prostate Surgery --  --          Medication List  Name Date Started Instructions   allopurinol 100 mg oral tablet 11/09/2020 TAKE 1 TABLET ONCE DAILY   cane miscellaneous device 01/06/2020 use as directed for 30 days    citalopram 20 mg oral tablet  take 1 tablet (20 mg) by oral route once daily   citalopram 40 mg oral tablet 11/02/2020 TAKE 1 TABLET ONCE DAILY   diltiazem HCl 360 mg oral capsule,extended release 24 hr  take 1 capsule (360 mg) by oral route once daily   Eliquis 5 mg oral tablet 12/05/2020 TAKE 1 TABLET TWICE A DAY   fluticasone propionate 50 mcg/actuation nasal spray,suspension 09/16/2019 spray 1 spray (50 mcg) in each nostril by intranasal route once daily for 30 days   furosemide 80 mg oral tablet 05/19/2020 take 1 tablet (80 mg) by oral route once daily for 90 days   hydralazine 10 mg oral tablet  take 1 tablet (10 mg) by oral route 4 times per day with food   metoprolol tartrate 25 mg oral tablet  take 1 tablet (25 mg) by oral route 2 times per day   omega-3 acid ethyl esters 1 gram oral capsule 01/06/2020 TAKE TWO CAPSULES BY MOUTH TWICE A DAY   omeprazole 20 mg oral capsule,delayed release(DR/EC)  take 1 capsule (20 mg) by oral route once daily before a meal   potassium chloride 10 mEq oral capsule, extended release 07/20/2020 TAKE 1 CAPSULE TWICE A DAY WITH FOOD   tamsulosin 0.4 mg oral capsule 07/20/2020 TAKE 1 CAPSULE DAILY 1/2 HOUR FOLLOWING THE SAME MEAL EACH DAY         Allergy List  Allergen Name Date Reaction Notes   NO KNOWN DRUG ALLERGIES --  --  --          Social History  Finding Status Start/Stop Quantity Notes   Tobacco Former --/-- --  --          Immunizations  NameDate Admin Mfg Trade Name Lot Number Route Inj VIS Given VIS Publication   HepB03/02/2020 SKB ENGERIX-B-ADULT 2573G IM  03/02/2020    Comments: NDC#[id number]   Qekqdhxyv41/05/2019 PMC FLUZONE-HIGH DOSE [id number] IM LA 12/05/2019    Comments: NDC 50418-652-71   Hekhzanzg2335/05/2019 MSD PNEUMOVAX 23 [id number] IM RD 12/05/2019    Comments: NDC 3823-0850-69   Tdap11/29/2017 PMC ADACEL [id number] NE NE 11/29/2017 02/24/2015   Comments: this is recorded out of old system/ ES         Review of Systems  · Constitutional  o Denies  o :  fatigue, fever  · Cardiovascular  o Denies  o : chest pain, palpitations  · Respiratory  o Denies  o : shortness of breath, cough  · Gastrointestinal  o Denies  o : nausea, vomiting, diarrhea, abdominal pain  · Psychiatric  o Denies  o : anxiety, depression      Vitals  Date Time BP Position Site L\R Cuff Size HR RR TEMP (F) WT  HT  BMI kg/m2 BSA m2 O2 Sat FR L/min FiO2 HC       01/26/2021 10:16 /100 Sitting    76 - R  97.1 222lbs 16oz    98 %            Physical Examination  · Constitutional  o Appearance  o : well developed, well-nourished, in no acute distress  · Respiratory  o Respiratory Effort  o : breathing unlabored  o Auscultation of Lungs  o : clear to ascultation  · Cardiovascular  o Heart  o :   § Auscultation of Heart  § : regular rate and rhythm  o Peripheral Vascular System  o :   § Extremities  § : no edema  · Gastrointestinal  o Abdomen  o : soft, non-tender, non-distended, + bowel sounds, no hepatosplenomegaly, no masses palpated  · Musculoskeletal  o General  o :   § General Musculoskeletal  § : No joint swelling or deformity., Muscle tone, strength, and development grossly normal.  · Neurologic  o Gait and Station  o :   § Gait Screening  § : normal gait  · Psychiatric  o Mood and Affect  o : mood normal, affect appropriate          Assessment  · Gallstone     574.20/K80.20      Plan  · Orders  o ACO-39: Current medications updated and reviewed (1159F, ) - - 01/26/2021  o US gallbladder (20847) - 574.20/K80.20 - 01/26/2021  · Medications  o Medications have been Reconciled  o Transition of Care or Provider Policy  · Instructions  o Patient was educated/instructed on their diagnosis, treatment and medications prior to discharge from the clinic today.            Electronically Signed by: Ambrocio Guy MD -Author on January 26, 2021 12:06:27 PM

## 2021-05-07 NOTE — OUTREACH NOTE
Quick Note      Patient Name: Edmond Lujan   Patient ID: [id number]   Sex: Male   YOB: 1945    Primary Care Provider: Ambrocio Guy MD   Referring Provider: Ambrocio Guy MD    Visit Date: March 30, 2020    Provider: Ambrocio Guy MD   Location: [facility]   Location Address: [address] [place] [address]   [place]  [address]   Location Phone: [phone]          History Of Present Illness  CCM Comprehensive Care Plan    This Chronic Medical Management Care Plan for Edmond Lujan, 74 year old Other Race,  or  male, has been monitored and managed, reviewed, revised, and and a new plan of care implemented for the month of March. A cumulative time of 35 minutes was spent on this patient record, including face to face with provider, phone call with patient, and chart review.   Regarding the patient's diagnoses Atrial Fibrillation, Benign prostatic hypertrophy (BPH) with incomplete bladder emptying, Chronic kidney disease, GERD (gastroesophageal reflux disease), High triglycerides, Hyperlipidemia, Hypertension, Osteoarthritis, and Renal insufficiency, the following items were adressed: medical records, medications, and transitions to medical care and any changes can be found within the plan section of the note. A detailed listing of time spent for chronic care management has been scanned into the patient's electronic record. Current medications include: allopurinol 100 mg oral tablet, Anoro Ellipta 62.5-25 mcg/actuation inhalation blister with device, atorvastatin 40 mg oral tablet, cane miscellaneous device, Cartia  mg oral capsule,extended release 24hr, citalopram 20 mg oral tablet, diltiazem HCl 360 mg oral capsule,extended release 24 hr, Eliquis 2.5 mg oral tablet, fluticasone propionate 50 mcg/actuation nasal spray,suspension, furosemide 80 mg oral tablet, Incruse Ellipta 62.5 mcg/actuation inhalation blister with device, omega-3 acid ethyl esters 1  gram oral capsule, potassium chloride 10 mEq oral capsule, extended release, ProAir HFA 90 mcg/actuation inhalation HFA aerosol inhaler, sodium bicarbonate 650 mg oral tablet, tamsulosin 0.4 mg oral capsule, and Zyrtec 10 mg oral tablet and the patient is reported to be compliant with medication protocol. Medications are reported to be non-effective in controlling symptoms and changes have been made to the medication protocol.   This patient's physical needs include: help taking medications as prescribed, medication education, and physical healthcare. With recent hospitalizations, patient unsure of what medication to take and which was discontinued. Advised to consult with cardiology, and to not take old medication.   Patient's mental support needs include: increased support. Patient needing support with his wife recently been admitted to long term care. Patient stressed with the COVID-19 visitor restrictions, as he cannot visit his wife.   The patient's cognitive support needs are currently being met.   The patient's psychosocial support needs include:, need for increased support. Patient needs help lately with stress and depression, related to his wife being admitted to long term care.   This patient's functional needs are N/A.   This patient's environmental needs are N/A.           Assessment  · Chronic Care Management (CCM)     V68.89/Z02.89  · Atrial Fibrillation     427.31/I48.91  · High triglycerides     272.1/E78.1  · Hyperlipidemia     272.4/E78.5  · Hypertension     401.9/I10      Plan  · Medications  o Medications have been Reconciled  o Transition of Care or Provider Policy  · Instructions  o Patient's Health Care Goals: Stay well, for my wife  o Provider's Health Care Goals: Medication Compliance; follow up with cardiology  o Patient was provided an electronic copy of care plan  o CCM services were explained and offered and patient has accepted these services.  o Patient has given their written consent  to recieve CCM services and understands that this includes the authorization of electronic communication of medical information with other treating providers.  o Patient understands that they may stop CCM services at any time and these changes will be effective at the end of the calendar month and will effectively revocate the agreement of CCM services.  o Patient understands that only one practioner can furnish and be paid for CCM services during one calendar month. Patient also understands that there may be co-payment or deductible fees in association with CCM services.  o Patient will continue with at least monthly follow-up calls with the Nurse Navigator.  · Associate Tasks  o Task ID [id number] CCM: CCM - March 2020            Electronically Signed by: Ana Lilia Lam RN -Author on April 1, 2020 03:28:09 PM

## 2021-05-07 NOTE — PROGRESS NOTES
"   Progress Note      Patient Name: Edmond Lujan   Patient ID: [id number]   Sex: Male   YOB: 1945    Primary Care Provider: Ambrocio Guy MD   Referring Provider: Ambrocio Guy MD    Visit Date: October 13, 2020    Provider: TIAGO Mcdonald   Location: [facility]   Location Address: [address] [place] [address]   [place]  [address]   Location Phone: [phone]          Chief Complaint     follow up from hospital       History Of Present Illness  Edmond Lujan is a 74 year old Other Race,  or  male who presents for evaluation and treatment of:      Hospital follow up for pancreatitis - abdominal pain is resolved - he completed a round on antibiotics -     Pt states he has been taking [employer] Vinegar supplement since Aug and states that his BP has gone down, HR is now in 60's - pt states he was supposed to have a stent placed but states stopped due to improvement - pt has stopped the supplement due to the pancreatitis    Pt was having home Health was coming twice weekly but has been d/c'd since heart is \"back to normal\"    Pt denies alcohol use            Past Medical History  Disease Name Date Onset Notes   Anxiety and depression --  --    Atrial Fibrillation --  --    Benign prostatic hypertrophy (BPH) with incomplete bladder emptying --  --    Chronic kidney disease --  --    COPD (chronic obstructive pulmonary disease) --  --    Dermatitis --  --    GERD (gastroesophageal reflux disease) --  --    Gout, arthritis --  --    High triglycerides --  --    Hyperlipidemia --  --    Hypertension --  --    Nonischemic cardiomyopathy --  --    SHERRILL on CPAP --  --    Osteoarthritis --  --    Renal insufficiency --  --    Thrombocytopenia --  --          Past Surgical History  Procedure Name Date Notes   Hip Surgery --  --    Prostate Surgery --  --          Medication List  Name Date Started Instructions   allopurinol 100 mg oral tablet 05/14/2020 take 1 tablet (100 " mg) by oral route once daily for 90 days   cane miscellaneous device 01/06/2020 use as directed for 30 days   citalopram 20 mg oral tablet  take 1 tablet (20 mg) by oral route once daily   diltiazem HCl 360 mg oral capsule,extended release 24 hr  take 1 capsule (360 mg) by oral route once daily   Eliquis 5 mg oral tablet 07/20/2020 TAKE 1 TABLET TWICE A DAY   fluticasone propionate 50 mcg/actuation nasal spray,suspension 09/16/2019 spray 1 spray (50 mcg) in each nostril by intranasal route once daily for 30 days   furosemide 80 mg oral tablet 05/19/2020 take 1 tablet (80 mg) by oral route once daily for 90 days   hydralazine 10 mg oral tablet  take 1 tablet (10 mg) by oral route 4 times per day with food   metoprolol tartrate 25 mg oral tablet  take 1 tablet (25 mg) by oral route 2 times per day   omega-3 acid ethyl esters 1 gram oral capsule 01/06/2020 TAKE TWO CAPSULES BY MOUTH TWICE A DAY   omeprazole 20 mg oral capsule,delayed release(DR/EC)  take 1 capsule (20 mg) by oral route once daily before a meal   potassium chloride 10 mEq oral capsule, extended release 07/20/2020 TAKE 1 CAPSULE TWICE A DAY WITH FOOD   tamsulosin 0.4 mg oral capsule 07/20/2020 TAKE 1 CAPSULE DAILY 1/2 HOUR FOLLOWING THE SAME MEAL EACH DAY         Allergy List  Allergen Name Date Reaction Notes   NO KNOWN DRUG ALLERGIES --  --  --          Social History  Finding Status Start/Stop Quantity Notes   Tobacco Former --/-- --  --          Immunizations  NameDate Admin Mfg Trade Name Lot Number Route Inj VIS Given VIS Publication   HepB03/02/2020 SKB ENGERIX-B-ADULT [id number] IM  03/02/2020    Comments: NDC#[id number]   Vlwdxdeur28/05/2019 PMC FLUZONE-HIGH DOSE [id number] IM LA 12/05/2019    Comments: NDC 36345-307-62   Lzthjaptr3190/05/2019 MSD PNEUMOVAX 23 [id number] IM RD 12/05/2019    Comments: NDC 3955-1508-12   Tdap11/29/2017 PMC ADACEL [id number] NE NE 11/29/2017 02/24/2015   Comments: this is recorded out of old system/ ES         Review of  "Systems  · Constitutional  o Admits  o : fatigue  o Denies  o : fever, chills, body aches  · Cardiovascular  o Denies  o : chest pain, palpitations  · Respiratory  o Admits  o : wheezing  o Denies  o : shortness of breath, cough  · Gastrointestinal  o Denies  o : nausea, vomiting, diarrhea, excessive belching, abdominal pain, blood in stools, melena, excessive flatulence, bloating  · Musculoskeletal  o Admits  o : knee pain      Vitals  Date Time BP Position Site L\R Cuff Size HR RR TEMP (F) WT  HT  BMI kg/m2 BSA m2 O2 Sat FR L/min FiO2        10/13/2020 10:50 /98 Sitting    101 - R  97.3 216lbs 8oz 5'  7\" 33.91 2.15 97 %  21%          Physical Examination  · Constitutional  o Appearance  o : well developed, well-nourished, in no acute distress  · Head and Face  o HEENT  o : wearing mask  · Eyes  o Conjunctivae  o : conjunctivae normal  o Pupils and Irises  o : pupils equal and round, pupils reactive to light bilaterally  · Neck  o Inspection/Palpation  o : supple  o Thyroid  o : no thyromegaly  · Respiratory  o Respiratory Effort  o : breathing unlabored  o Auscultation of Lungs  o : clear to ascultation  · Cardiovascular  o Heart  o :   § Auscultation of Heart  § : regular rate and rhythm  o Peripheral Vascular System  o :   § Extremities  § : no edema  · Gastrointestinal  o Abdominal Examination  o :   § Abdomen  § : bowel sounds +, soft, non-tender, non-distended  · Lymphatic  o Neck  o : no lymphadenopathy present  · Musculoskeletal  o General  o :   § General Musculoskeletal  § : No joint swelling or deformity. Muscle tone, strength, and development grossly normal.  · Skin and Subcutaneous Tissue  o General Inspection  o : NL tone  · Neurologic  o Gait and Station  o :   § Gait Screening  § : normal gait  · Psychiatric  o Mood and Affect  o : mood normal, affect appropriate          Assessment  · Acute pancreatitis     577.0/K85.90  · Hospital discharge " follow-up     V67.59/Z09      Plan  · Orders  o CMP [facility] ([id number]) - 577.0/K85.90 - 10/13/2020  o ACO-39: Current medications updated and reviewed (1159F, ) - - 10/13/2020  o Amylase/Lipase Profile (ALPN) - 577.0/K85.90 - 10/13/2020  · Medications  o Medications have been Reconciled  o Transition of Care or Provider Policy  · Instructions  o Patient was educated/instructed on their diagnosis, treatment and medications prior to discharge from the clinic today.  · Disposition  o Follow up as needed.            Electronically Signed by: TIAGO Mcdonald -Author on October 13, 2020 12:16:49 PM

## 2021-05-07 NOTE — OUTREACH NOTE
Quick Note      Patient Name: Edmond Lujan   Patient ID: [id number]   Sex: Male   YOB: 1945    Primary Care Provider: Ambrocio Guy MD   Referring Provider: Ambrocio Guy MD    Visit Date: November 30, 2020    Provider: Ambrocio Guy MD   Location: [facility]   Location Address: [address] [place] [address]   [place]  [address]   Location Phone: [phone]          History Of Present Illness  CCM Comprehensive Care Plan    This Chronic Medical Management Care Plan for Edmond Lujan, 75 year old Other Race,  or  male, has been monitored and managed and reviewed for the month of November. A cumulative time of 35 minutes was spent on this patient record, including phone call with patient, electronic communication with other providers, and chart review.   Regarding the patient's diagnoses Anxiety and depression, Atrial Fibrillation, Benign prostatic hypertrophy (BPH) with incomplete bladder emptying, Chronic kidney disease, COPD (chronic obstructive pulmonary disease), GERD (gastroesophageal reflux disease), High triglycerides, Hyperlipidemia, Hypertension, Osteoarthritis, and Renal insufficiency, the following items were adressed: medical records and medications and any changes can be found within the plan section of the note. A detailed listing of time spent for chronic care management has been scanned into the patient's electronic record. Current medications include: allopurinol 100 mg oral tablet, cane miscellaneous device, citalopram 20 mg oral tablet, citalopram 40 mg oral tablet, diltiazem HCl 360 mg oral capsule,extended release 24 hr, Eliquis 5 mg oral tablet, fluticasone propionate 50 mcg/actuation nasal spray,suspension, furosemide 80 mg oral tablet, hydralazine 10 mg oral tablet, metoprolol tartrate 25 mg oral tablet, omega-3 acid ethyl esters 1 gram oral capsule, omeprazole 20 mg oral capsule,delayed release(DR/EC), potassium chloride 10 mEq oral  capsule, extended release, and tamsulosin 0.4 mg oral capsule and the patient is reported to be compliant with medication protocol. Medications are reported to be effective. Regarding these diagnoses, referrals were made to the following provider/s N/A.   The patient was monitored remotely for blood pressure for a period of 15 minutes.   This patient's physical needs are currently being met.   Patient's mental support needs include: continued support and contact information. Patient doing very well per his own admission . Continued support from staff may be needed to help him maintain his current status . Patient has been supplied with Contact information.   The patient's cognitive support needs are currently being met.   The patient's psychosocial support needs include:, continued support. he stated he has strong sukumar in God and reads the bible to help him stay upbeat as well as his friends . Continued support from staff will only assist even more..   This patient's functional needs are N/A.   This patient's environmental needs are N/A.           Assessment  · Chronic Care Management (CCM)     V68.89/Z02.89  · Renal insufficiency     593.9/N28.9  · Atrial Fibrillation     427.31/I48.91  · Benign prostatic hypertrophy (BPH) with incomplete bladder emptying     600.01/N40.1  · Chronic kidney disease     585.9/N18.9  · GERD (gastroesophageal reflux disease)     530.81/K21.9  · High triglycerides     272.1/E78.1  · Hyperlipidemia     272.4/E78.5  · Hypertension     401.9/I10  · Anxiety and depression       Anxiety disorder, unspecified     300.00/F41.9  Major depressive disorder, single episode, unspecified     300.00/F32.9  · Osteoarthritis     715.90/M19.90  · COPD (chronic obstructive pulmonary disease)     496/J44.9      Plan  · Instructions  o Patient's Health Care Goals: Maintain current status  o Provider's Health Care Goals: Medication compliance and regular follow-up with provider and specialists  o Patient  was provided an electronic copy of care plan  o CCM services were explained and offered and patient has accepted these services.  o Patient has given their written consent to recieve CCM services and understands that this includes the authorization of electronic communication of medical information with other treating providers.  o Patient understands that they may stop CCM services at any time and these changes will be effective at the end of the calendar month and will effectively revocate the agreement of CCM services.  o Patient understands that only one practioner can furnish and be paid for CCM services during one calendar month. Patient also understands that there may be co-payment or deductible fees in association with CCM services.  o Patient will continue with at least monthly follow-up calls with the Nurse Navigator.  · Associate Tasks  o Task ID [id number] CCM: CCM Note November 2020            Electronically Signed by: Ana Lilia Lam RN -Author on November 30, 2020 10:47:28 AM

## 2021-05-07 NOTE — OUTREACH NOTE
Quick Note      Patient Name: Edmond Lujan   Patient ID: [id number]   Sex: Male   YOB: 1945    Primary Care Provider: Ambrocio Guy MD   Referring Provider: Ambrocio Guy MD    Visit Date: January 28, 2021    Provider: Ambrocio Guy MD   Location: [facility]   Location Address: [address] [place] [address]   [place]  [address]   Location Phone: [phone]          History Of Present Illness  CCM Comprehensive Care Plan    This Chronic Medical Management Care Plan for Edmond Lujan, 75 year old Other Race,  or  male, has been monitored and managed, reviewed, revised, and and a new plan of care implemented for the month of January. A cumulative time of 31 minutes was spent on this patient record, including phone call with patient, electronic communication with primary care provider, electronic communication with other providers, and chart review.   Regarding the patient's diagnoses Anxiety and depression, Atrial Fibrillation, Benign prostatic hypertrophy (BPH) with incomplete bladder emptying, Chronic kidney disease, COPD (chronic obstructive pulmonary disease), GERD (gastroesophageal reflux disease), High triglycerides, Hyperlipidemia, Hypertension, Osteoarthritis, and Renal insufficiency, the following items were adressed: medical records, medications, transitions to medical care, and referrals to community service providers and any changes can be found within the plan section of the note. A detailed listing of time spent for chronic care management has been scanned into the patient's electronic record. Current medications include: allopurinol 100 mg oral tablet, cane miscellaneous device, citalopram 20 mg oral tablet, citalopram 40 mg oral tablet, diltiazem HCl 360 mg oral capsule,extended release 24 hr, Eliquis 5 mg oral tablet, fluticasone propionate 50 mcg/actuation nasal spray,suspension, furosemide 80 mg oral tablet, hydralazine 10 mg oral tablet,  metoprolol tartrate 25 mg oral tablet, omega-3 acid ethyl esters 1 gram oral capsule, omeprazole 20 mg oral capsule,delayed release(DR/EC), potassium chloride 10 mEq oral capsule, extended release, and tamsulosin 0.4 mg oral capsule and the patient is reported to be compliant with medication protocol. Medications are reported to be effective. Regarding these diagnoses, referrals were made to the following provider/s: PATIENT HAS MENTIONED THAT HE WILL NEED A REFERRAL TO SEE SOMEONE FOR HIS PANCREATITIS. OLD PROVIDER IS NO LONGER IN-NETWORK FOR HIM.   The patient was monitored remotely for blood pressure, activity level, and A-fib for a period of 2 minutes.   This patient's physical needs include: help taking medications as prescribed and physical healthcare. Patient currently living alone, as his wife is in the Nursing Home for dementia. Helping patient to stay on top of appts to help with current health problems as well as to keep him out of the ER. Patient had frequent episodes of ER utilization in 2020, so I have tried to stay in close contact with to anticipate needs. Patient to be seen by PCP this month and will see about getting referral to Endocrine to help manage his recent episode of pancreatitis. Patient to discuss this with provider at next appt.   Patient's mental support needs include: continued support. continued support provided to patient as he has been more depressed since his wife was admitted to long term care. Covid-19 has prevented him from seeing her as often, and with current guidelines, he is only able to see her through her window.   The patient's cognitive support needs include: continued support and coordination of community providers. Helping patient to stay on top of his PCP visits, as well as his specialist visits, as patient was a No-show to an appt recently.   The patient's psychosocial support needs include:, need for increased support, coordination of community providers, and  medication monitoring and or assistance. increased support to patient has he is managing his own health at this time. Patient spouse in is long term care, so is managing everything alone. Helping patient to stay up to date on screenings and keep up with visits with pcp and outside providers.   This patient's functional needs are N/A.   This patient's environmental needs are N/A.           Assessment  · Chronic Care Management (CCM)     V68.89/Z02.89  · Atrial Fibrillation     427.31/I48.91  · GERD (gastroesophageal reflux disease)     530.81/K21.9  · Anxiety and depression       Anxiety disorder, unspecified     300.00/F41.9  Major depressive disorder, single episode, unspecified     300.00/F32.9      Plan  · Medications  o Medications have been Reconciled  o Transition of Care or Provider Policy  · Instructions  o Patient's Health Care Goals: Staying well in order to be able to see his wife in LTC  o Provider's Health Care Goals: Medication Compliance and Compliance with specialty visits  o Patient was provided an electronic copy of care plan  o CCM services were explained and offered and patient has accepted these services.  o Patient has given their written consent to recieve CCM services and understands that this includes the authorization of electronic communication of medical information with other treating providers.  o Patient understands that they may stop CCM services at any time and these changes will be effective at the end of the calendar month and will effectively revocate the agreement of CCM services.  o Patient understands that only one practioner can furnish and be paid for CCM services during one calendar month. Patient also understands that there may be co-payment or deductible fees in association with CCM services.  o Patient will continue with at least monthly follow-up calls with the Nurse Navigator.  · Associate Tasks  o Task ID [id number] CCM: CCM - January 2021            Electronically Signed  by: Rory Melgoza MA -Author on January 28, 2021 01:18:25 PM

## 2021-05-07 NOTE — OUTREACH NOTE
Quick Note      Patient Name: Edmond Lujan   Patient ID: [id number]   Sex: Male   YOB: 1945    Primary Care Provider: Ambrocio Guy MD   Referring Provider: Ambrocio Guy MD    Visit Date: September 26, 2019    Provider: Ambrocio Guy MD   Location: [facility]   Location Address: [address] [place] [address] [place]  [address]   Location Phone: [phone]          History Of Present Illness     [facility]     TaskID: [id number]    Task Subject: CCM - September 2019    Task Comments:    Date: Sep 25 2019  1:18PM     Creator: america Pruitt returned my call, he let me know that [facility] will only fill 15 tablets of his eliquis at one time. He states that his insurnace has informed him to use a mail in pharmacy, but did not specify which he would like to use. I sent a task to Dr. Guy asking him to send an Rx to a mail in pharmacy for Edmond. (5 minutes)    Date: Sep 25 2019 11:20AM     Creator: america  I attempted to call Mr. Lujan to ensure he was able to  his eliquis, I left a message for him to return my call.  (3 minutes)    Date: Sep 23 2019 11:40AM     Creator: america  Received a message from Edmond, he states that he was unable to  his Eliquis from the pharmacy. I called the [facility] to determine the problem, the pharmacist I spoke with stated that the only RX they had on file was eliquis 2.5 mg, I stated that the new RX was for 5 mg tablets. He asked me to send over the RX, I faxed over the copy of the RX dated for 9/5/19 from Dr. Guy. I called Edmond to let him know that he should be able to  his new meds. (8 minutes)    Date: Sep 16 2019  3:34PM     Creator: america  Verified PT's medications with Dr. Guy, he will send in refills of PT's medication to his pharmacy. I called Mr. Lujan to let him know. (6 minutes)    Date: Sep 16 2019  3:25PM     Creator: america  PT Consented to [facility] services, he states that his primary concern is  obtaining and taking his medications like he is supposed to. He asked me to speak with Dr. Guy concerning his refills of medications he was started on in the ER. (verbal consent, 5 minutes)    Date: Sep  9 2019 12:44PM     Creator: america  Mailed PT CCM consent and information. Will F/U next week for possible consent.                   Electronically Signed by: Yosef Branham RN -Author on September 26, 2019 02:10:33 PM

## 2021-05-07 NOTE — PROGRESS NOTES
Progress Note      Patient Name: Edmond Lujan   Patient ID: [id number]   Sex: Male   YOB: 1945    Primary Care Provider: Ambrocio Guy MD   Referring Provider: Ambrocio Guy MD    Visit Date: March 16, 2020    Provider: Ambrocio Guy MD   Location: [facility] [place]   Location Address: [address] [place] [address]   [place]  [address]   Location Phone: [phone]          Chief Complaint     2 weeks follow up  got out of hospital yesterday.       History Of Present Illness  Edmond Lujan is a 74 year old Other Race,  or  male who presents for evaluation and treatment of:      pt was recently seen in hospital for CHF- had metoprolol DC'd and cardiology started diltiazem 360mg- pt awaiting getting stent- pt doing a little better but seeing cardiology    pt having more anxiety and stress since wife is in long-term facility for dementia and he cannot see her due to coronavirus threat       Past Medical History  Disease Name Date Onset Notes   Atrial Fibrillation --  --    Benign prostatic hypertrophy (BPH) with incomplete bladder emptying --  --    Chronic kidney disease --  --    COPD (chronic obstructive pulmonary disease) --  --    Dermatitis --  --    GERD (gastroesophageal reflux disease) --  --    Gout, arthritis --  --    High triglycerides --  --    Hyperlipidemia --  --    Hypertension --  --    Nonischemic cardiomyopathy --  --    SHERRILL on CPAP --  --    Osteoarthritis --  --    Renal insufficiency --  --    Thrombocytopenia --  --          Past Surgical History  Procedure Name Date Notes   Hip Surgery --  --    Prostate Surgery --  --          Medication List  Name Date Started Instructions   allopurinol 100 mg oral tablet 10/02/2019 take 1 tablet (100 mg) by oral route once daily for 90 days   Anoro Ellipta 62.5-25 mcg/actuation inhalation blister with device 10/02/2019 inhale 1 puff by inhalation route once daily at the same time each day for 90 days    atorvastatin 40 mg oral tablet 10/02/2019 take 1 tablet (40 mg) by oral route once daily at bedtime for 90 days   cane miscellaneous device 01/06/2020 use as directed for 30 days   Cartia  mg oral capsule,extended release 24hr 10/02/2019 take 1 capsule (240 mg) by oral route once daily for 90 days   citalopram 10 mg oral tablet  take 1 tablet (10 mg) by oral route once daily   diltiazem HCl 360 mg oral capsule,extended release 24 hr  take 1 capsule (360 mg) by oral route once daily   Eliquis 2.5 mg oral tablet  take 1 tablet (2.5 mg) by oral route 2 times per day   fluticasone propionate 50 mcg/actuation nasal spray,suspension 09/16/2019 spray 1 spray (50 mcg) in each nostril by intranasal route once daily for 30 days   furosemide 80 mg oral tablet 10/02/2019 take 1 tablet (80 mg) by oral route once daily for 90 days   Incruse Ellipta 62.5 mcg/actuation inhalation blister with device 01/06/2020 inhale 1 puff daily   omega-3 acid ethyl esters 1 gram oral capsule 01/06/2020 TAKE TWO CAPSULES BY MOUTH TWICE A DAY   potassium chloride 10 mEq oral capsule, extended release 10/02/2019 take 1 capsule (10 meq) by oral route 2 times per day with food for 90 days   ProAir HFA 90 mcg/actuation inhalation HFA aerosol inhaler 01/06/2020 inhale 1 - 2 puffs (90 - 180 mcg) by inhalation route every 4-6 hours as needed for 90 days   tamsulosin 0.4 mg oral capsule 10/02/2019 take 1 capsule (0.4 mg) by oral route once daily 1/2 hour following the same meal each day for 90 days   Zyrtec 10 mg oral tablet 10/02/2019 take 1 tablet at bedtime         Allergy List  Allergen Name Date Reaction Notes   NO KNOWN DRUG ALLERGIES --  --  --          Social History  Finding Status Start/Stop Quantity Notes   Tobacco Former --/-- --  --          Immunizations  NameDate Admin Mfg Trade Name Lot Number Route Inj VIS Given VIS Publication   HepB03/02/2020 SKB ENGERIX-B-ADULT [id number] IM  03/02/2020    Comments: NDC#[id number]    Dwakouwqq56/05/2019 PMC FLUZONE-HIGH DOSE [id number] IM LA 12/05/2019    Comments: NDC 68850-781-99   InfluenzaUnknown UNK Unknown TradeName [id number] NE NE 11/29/2017 08/07/2015   Comments: this is recorded out of the old system   Rdvzplhoc0298/05/2019 MSD PNEUMOVAX 23 [id number] IM RD 12/05/2019    Comments: NDC 8507-3785-14   TdapUnknown PMC ADACEL [id number] NE NE 11/29/2017 02/24/2015   Comments: this is recorded out of old system/ ES         Review of Systems  · Constitutional  o Denies  o : fatigue, fever  · Cardiovascular  o Denies  o : chest pain, palpitations  · Respiratory  o Denies  o : shortness of breath, cough  · Gastrointestinal  o Denies  o : nausea, vomiting, diarrhea  · Psychiatric  o Admits  o : anxiety, depression  o Denies  o : suicidal ideation, homicidal ideation      Vitals  Date Time BP Position Site L\R Cuff Size HR RR TEMP (F) WT  HT  BMI kg/m2 BSA m2 O2 Sat        03/16/2020 01:32 /84 Sitting    120 - R  97.1 207lbs 4oz    97 %          Physical Examination  · Constitutional  o Appearance  o : well developed, well-nourished, in no acute distress  · Respiratory  o Respiratory Effort  o : breathing unlabored  o Auscultation of Lungs  o : clear to ascultation  · Cardiovascular  o Heart  o :   § Auscultation of Heart  § : regular rate and rhythm  o Peripheral Vascular System  o :   § Extremities  § : no edema  · Gastrointestinal  o Abdomen  o : soft, non-tender, non-distended, + bowel sounds, no hepatosplenomegaly, no masses palpated  · Musculoskeletal  o General  o :   § General Musculoskeletal  § : No joint swelling or deformity., Muscle tone, strength, and development grossly normal.  · Neurologic  o Gait and Station  o :   § Gait Screening  § : normal gait  · Psychiatric  o Mood and Affect  o : mood normal, affect appropriate          Assessment  · Anxiety and depression       Anxiety disorder, unspecified     300.00/F41.9  Major depressive disorder, single episode,  unspecified     300.00/F32.9      Plan  · Orders  o ACO-39: Current medications updated and reviewed () - - 03/16/2020  · Medications  o citalopram 20 mg oral tablet   SIG: take 1 tablet (20 mg) by oral route once daily for 30 days   DISP: (30) tablets with 1 refills  Prescribed on 03/16/2020     o Medications have been Reconciled  o Transition of Care or Provider Policy  · Instructions  o Patient was educated/instructed on their diagnosis, treatment and medications prior to discharge from the clinic today.            Electronically Signed by: Ambrocio Guy MD -Author on March 16, 2020 01:48:08 PM

## 2021-05-09 VITALS
HEART RATE: 100 BPM | OXYGEN SATURATION: 96 % | WEIGHT: 212 LBS | TEMPERATURE: 98.4 F | SYSTOLIC BLOOD PRESSURE: 130 MMHG | DIASTOLIC BLOOD PRESSURE: 90 MMHG

## 2021-05-09 VITALS
TEMPERATURE: 97.4 F | SYSTOLIC BLOOD PRESSURE: 120 MMHG | DIASTOLIC BLOOD PRESSURE: 90 MMHG | HEART RATE: 94 BPM | OXYGEN SATURATION: 99 % | HEIGHT: 67 IN | WEIGHT: 207.37 LBS | BODY MASS INDEX: 32.55 KG/M2

## 2021-05-09 VITALS
BODY MASS INDEX: 32.08 KG/M2 | WEIGHT: 204.37 LBS | TEMPERATURE: 97.6 F | OXYGEN SATURATION: 99 % | HEART RATE: 146 BPM | DIASTOLIC BLOOD PRESSURE: 110 MMHG | SYSTOLIC BLOOD PRESSURE: 168 MMHG | HEIGHT: 67 IN

## 2021-05-09 VITALS
HEART RATE: 75 BPM | TEMPERATURE: 97.5 F | WEIGHT: 186.37 LBS | DIASTOLIC BLOOD PRESSURE: 80 MMHG | SYSTOLIC BLOOD PRESSURE: 130 MMHG | OXYGEN SATURATION: 98 %

## 2021-05-09 VITALS
HEART RATE: 96 BPM | SYSTOLIC BLOOD PRESSURE: 130 MMHG | TEMPERATURE: 97.6 F | DIASTOLIC BLOOD PRESSURE: 80 MMHG | WEIGHT: 197 LBS | OXYGEN SATURATION: 98 %

## 2021-05-09 VITALS
WEIGHT: 205 LBS | TEMPERATURE: 97.3 F | DIASTOLIC BLOOD PRESSURE: 85 MMHG | OXYGEN SATURATION: 98 % | SYSTOLIC BLOOD PRESSURE: 145 MMHG | DIASTOLIC BLOOD PRESSURE: 105 MMHG | SYSTOLIC BLOOD PRESSURE: 189 MMHG | HEART RATE: 67 BPM

## 2021-05-09 VITALS
TEMPERATURE: 97.1 F | WEIGHT: 223 LBS | SYSTOLIC BLOOD PRESSURE: 160 MMHG | HEART RATE: 76 BPM | DIASTOLIC BLOOD PRESSURE: 100 MMHG | DIASTOLIC BLOOD PRESSURE: 90 MMHG | SYSTOLIC BLOOD PRESSURE: 150 MMHG | OXYGEN SATURATION: 98 % | BODY MASS INDEX: 31.1 KG/M2

## 2021-05-09 VITALS
SYSTOLIC BLOOD PRESSURE: 142 MMHG | HEIGHT: 67 IN | WEIGHT: 216.5 LBS | DIASTOLIC BLOOD PRESSURE: 98 MMHG | TEMPERATURE: 97.3 F | HEART RATE: 101 BPM | OXYGEN SATURATION: 97 % | BODY MASS INDEX: 33.98 KG/M2

## 2021-05-09 VITALS
HEART RATE: 120 BPM | OXYGEN SATURATION: 97 % | WEIGHT: 207.25 LBS | SYSTOLIC BLOOD PRESSURE: 150 MMHG | TEMPERATURE: 97.1 F | DIASTOLIC BLOOD PRESSURE: 84 MMHG

## 2021-05-28 VITALS
HEIGHT: 70 IN | TEMPERATURE: 98.2 F | HEART RATE: 69 BPM | WEIGHT: 204 LBS | OXYGEN SATURATION: 97 % | BODY MASS INDEX: 29.2 KG/M2 | RESPIRATION RATE: 16 BRPM | DIASTOLIC BLOOD PRESSURE: 92 MMHG | SYSTOLIC BLOOD PRESSURE: 191 MMHG

## 2021-05-28 NOTE — PROGRESS NOTES
Patient: GALE VILLASENOR     Acct: [id number]     Report: #[id number]  UNIT #: [id number]     : 1945    Encounter Date:2020  PRIMARY CARE: MIKE ALVARENGA co  ***Signed***  --------------------------------------------------------------------------------------------------------------------  Chief Complaint      Encounter Date      2020            Primary Care Provider      MIKE ALVARNEGA            Referring Provider      MIKE ALVARENGA            Patient Complaint      Patient is complaining of      New Patient- COPD            VITALS      Height 5 ft 10 in / 177.8 cm      Weight 204 lbs  / 92.34584 kg      BSA 2.10 m2      BMI 29.3 kg/m2      Temperature 98.2 F / 36.78 C - Tympanic      Pulse 69      Respirations 16      Blood Pressure 191/92 Sitting, Left Arm      Pulse Oximetry 97%, room air            HPI      The patient is a 75 year old  male with chronic obstructive pulmonary     disease and sleep apnea here for follow up today.             I last saw him almost 3 years ago and he is doing well. He has minimal dyspnea,     denies any coughing, wheezing, headaches and hemoptysis, weight loss or chest     pain. He has not had a cigarette in a number of years. He has been compliant     with his CPAP machine. Of note, he notes some nasal stuffiness and pressures in     his ears and has tried nasal sprays to help with this. He would like to stop his    CPAP machine and just try oxygen at night. He denies any nausea or vomiting,     fever or chills. He is able to perform his ADL's without difficulty and denies     any swollen glands in his lymph nodes, head or neck.            I personally reviewed Review of Systems, family, social, surgical and medical     history and agree with their findings.            ROS      Constitutional:  Denies: Fatigue, Fever, Weight gain, Weight loss, Chills,     Insomnia, Other      Respiratory/Breathing:  Denies: Shortness of air, Wheezing,  Cough, Hemoptysis,     Pleuritic pain, Other      Endocrine:  Denies: Polydipsia, Polyuria, Heat/cold intolerance, Diabetes, Other      Eyes:  Denies: Blurred vision, Vision Changes, Other      Ears, nose, mouth, throat:  Denies: Mouth lesions, Thrush, Throat pain,     Hoarseness, Allergies/Hay Fever, Post Nasal Drip, Headaches, Recent Head Injury,    Nose Bleeding, Neck Stiffness, Thyroid Mass, Hearing Loss, Ear Fullness, Dry     Mouth, Nasal or Sinus Pain, Dry Lips, Nasal discharge, Nasal congestion, Other      Cardiovascular:  Denies: Palpitations, Syncope, Claudication, Chest Pain, Wake     up Gasping for air, Leg Swelling, Irregular Heart Rate, Cyanosis, Dyspnea on     Exertion, Other      Gastrointestinal:  Denies: Nausea, Constipation, Diarrhea, Abdominal pain,     Vomiting, Difficulty Swallowing, Reflux/Heartburn, Dysphagia, Jaundice,     Bloating, Melena, Bloody stools, Other      Genitourinary:  Denies: Urinary frequency, Incontinence, Hematuria, Urgency,     Nocturia, Dysuria, Testicular problems, Other      Musculoskeletal:  Denies: Joint Pain, Joint Stiffness, Joint Swelling, Myalgias,    Other      Hematologic/lymphatic:  DENIES: Lymphadenopathy, Bruising, Bleeding tendencies,     Other      Neurological:  Denies: Headache, Numbness, Weakness, Seizures, Other      Psychiatric:  Denies: Anxiety, Appropriate Effect, Depression, Other      Sleep:  No: Excessive daytime sleep, Morning Headache?, Snoring, Insomnia?, Stop    breathing at sleep?, Other      Integumentary:  Denies: Rash, Dry skin, Skin Warm to Touch, Other      Immunologic/Allergic:  Complains of: Seasonal allergies; Denies: Latex allergy,     Asthma, Urticaria, Eczema, Other      Immunization status:  No: Up to date            FAMILY/SOCIAL/MEDICAL HX      Surgical History:  Yes: Orthopedic Surgery (LEFT HIP SURGERY)      Stroke - Family Hx:  Father      Heart - Family Hx:  Father      Diabetes - Family Hx:  Father      Is Father Still  Living?:  No      Is Mother Still Living?:  No       Family History:  Yes      Social History:  No Tobacco Use, No Alcohol Use, No Recreational Drug use      Smoking status:  Former smoker (quit 1989, 2 cigars per day x 20 years)      Anticoagulation Therapy:  Yes      Antibiotic Prophylaxis:  No      Medical History:  Yes: Arthritis (HANDS AND KNEES AND GOUT IN FEET ), Gout, High    Blood Pressure (CONTROLLED WITH MEDS), Kidney or Bladder Disease, Miscellaneous     Medical/oth (prostate); No: Asthma, Blood Disease, Chemotherapy/Cancer, Chronic     Bronchitis/COPD, Congestive Heart Failu, Deafness or Ringing Ears, Diabetes,     Seizures, Heart Attack, Hemorrhoids/Rectal Prob, Shortness Of Breath, Sinus     Trouble      Psychiatric History      none            PREVENTION      Hx Influenza Vaccination:  Yes      Date Influenza Vaccine Given:  Nov 1, 2020      Influenza Vaccine Declined:  No      2 or More Falls in Past Year?:  No      Fall Past Year with Injury?:  No      Hx Pneumococcal Vaccination:  Yes      Encouraged to follow-up with:  PCP regarding preventative exams.      Chart initiated by      Blanca Lamar CMA            ALLERGIES/MEDICATIONS      Allergies:        Coded Allergies:             NO KNOWN DRUG ALLERGIES (Unverified  Allergy, Unknown, 11/16/20)      Medications    Last Reconciled on 11/16/20 13:16 by ANDER GATES MD      Citalopram HBr (Citalopram HBr) 20 Mg Tablet      20 MG PO HS, #30 TAB 0 Refills         Reported         3/18/20       Sodium Bicarbonate (Sodium Bicarbonate) 650 Mg Tablet      650 MG PO BID, TAB         Reported         3/18/20       dilTIAZem 24HR ER (XR) (dilTIAZem 24HR ER (XR)) 180 Mg Cap.er.deg      360 MG PO QDAY for 30 Days, #60 CAP.ER         Reported         3/18/20       Metoprolol Tartrate (Metoprolol Tartrate) 50 Mg Tablet      25 MG PO BID, #30 TAB 0 Refills         Reported         7/5/17      Current Medications      Current Medications Reviewed 11/16/20             EXAM      Vital Signs Reviewed      Gen: WDWN, Alert, NAD.        HEENT:  PERRL, EOMI.  OP, nares clear, no sinus tenderness. Mallampati II     airway.       Chest:  Good aeration, clear to auscultation bilaterally, tympanic to percussion    bilaterally, no work of breathing noted.      CV:  RRR, no MGR, pulses 2+, equal.      Abd:  Soft, NT, ND, + BS, no HSM.      EXT:  No clubbing, no cyanosis, no edema.       Neuro:  A  Skin: No rashes or lesions.      Vtials      Vitals:             Height 5 ft 10 in / 177.8 cm           Weight 204 lbs  / 92.62153 kg           BSA 2.10 m2           BMI 29.3 kg/m2           Temperature 98.2 F / 36.78 C - Tympanic           Pulse 69           Respirations 16           Blood Pressure 191/92 Sitting, Left Arm           Pulse Oximetry 97%, room air            REVIEW      Results Reviewed      PCCS Results Reviewed?:  Yes Previous Mecial Records      Lab Results      I personally reviewed my last office note.            Assessment      IMPRESSION:      1. Seasonal allergies well controlled.       2. Intermittent dyspnea at baseline.       3. Mild chronic obstructive pulmonary disease FEV1 84%, alpha 1 antitrypsin     genotype MM, GOLD stage A chronic obstructive pulmonary disease that is well     controlled. Chronic obstructive pulmonary disease assessment test score is 2     today.       4. Tobacco abuse of cigars in remission, he quit smoking 30 years ago.       5. History of tuberculosis status post treatment 35 years ago.       6. Obstructive sleep apnea. Having issues with CPAP tolerance and nasal pres    sure. Would like to do just do oxygen and get off his CPAP if possible.             PLAN:      1. Continue follow up with allergy and immunology. Continue allergy shots and     Singulair.       2. Continue brovana  ipratropium with albuterol as needed.       3. We will discontinue CPAP per patient preference and do 2 liters of oxygen     nightly. We will have him follow up  in a few months and if he is feeling worse     from a sleep apnea standpoint we will need to go back with CPAP machine.     Ultimately he may need to be referred to a sleep specialist if he is still     having any issues with any of these interventions.       4. Up to date with  flu, Prevnar and Pneumovax.         5. Follow up with us in a few months.            Patient Education      Patient Education Provided:  Sleep Apnea            Electronically signed by ANDER GATES  11/20/2020 09:37       Disclaimer: Converted document may not contain table formatting or lab diagrams. Please see [facility] for the authenticated document.

## 2021-11-12 PROBLEM — J44.9 COPD (CHRONIC OBSTRUCTIVE PULMONARY DISEASE) (HCC): Status: ACTIVE | Noted: 2021-11-12

## 2021-12-14 NOTE — PROGRESS NOTES
Progress Note      Patient Name: Edmond Lujan   Patient ID: [id number]   Sex: Male   YOB: 1945    Primary Care Provider: Ambrocio Guy MD   Referring Provider: Ambrocio Guy MD    Visit Date: March 2, 2020    Provider: Ambrocio Guy MD   Location: [facility]   Location Address: [address] [place] [address] [place]  [id number]   Location Phone: [phone]          Chief Complaint  · Annual Wellness Exam      History Of Present Illness  The patient is a 74 year old Other Race,  or  male who has come to this office for his Annual Wellness Visit. His Primary Care Provider is Ambrocio Guy MD. His comprehensive Care Team list, including suppliers, has been updated on the Facesheet. His medical/family history, height, weight, BMI, and blood pressure have been reviewed and are in the chart. The Health Risk Assessment has been completed and scanned in the chart.   Medications are listed in the medication list.   The Mini-Cog has been administered and is scanned in chart. The results are negative. His cognitive function is without limitation.   A hearing loss screen was completed today and the result is negative.   Patient does not have any risk factors for depression. Patient completed the PHQ-9 today and it has been scanned in the chart. The total score is 10-14 indicating moderate depression.   The Timed-Up-and-Go screen was administered today and the result is negative.   The Elmore Index of [place] in ADLs indicated full function (score of 6).   A Falls Risk Assessment has been completed, including a review of home fall hazards and medication review.   Overall, the patient's functional ability is noted by this provider to be within normal limits. His level of safety is noted to be within normal limits. His balance/gait is within normal limits. There have been no falls in the past year. Patient-specific home safety recommendations have been reviewed and a  copy has been given to patient.   He denies issues with leaking urine.   There are no additional risk factors identified.   Living Will/Advanced Directive previously executed but not in chart.   Personalized health advice was given to the patient and a written health screening schedule was established; see Plan for details.   Edmond Lujan is a 74 year old Other Race,  or  male who presents for evaluation and treatment of:       Past Medical History  Disease Name Date Onset Notes   Atrial Fibrillation --  --    Benign prostatic hypertrophy (BPH) with incomplete bladder emptying --  --    Chronic kidney disease --  --    COPD (chronic obstructive pulmonary disease) --  --    Dermatitis --  --    GERD (gastroesophageal reflux disease) --  --    Gout, arthritis --  --    High triglycerides --  --    Hyperlipidemia --  --    Hypertension --  --    Nonischemic cardiomyopathy --  --    SHERRILL on CPAP --  --    Osteoarthritis --  --    Renal insufficiency --  --    Thrombocytopenia --  --          Past Surgical History  Procedure Name Date Notes   Hip Surgery --  --    Prostate Surgery --  --          Medication List  Name Date Started Instructions   allopurinol 100 mg oral tablet 10/02/2019 take 1 tablet (100 mg) by oral route once daily for 90 days   Anoro Ellipta 62.5-25 mcg/actuation inhalation blister with device 10/02/2019 inhale 1 puff by inhalation route once daily at the same time each day for 90 days   atorvastatin 40 mg oral tablet 10/02/2019 take 1 tablet (40 mg) by oral route once daily at bedtime for 90 days   cane miscellaneous device 01/06/2020 use as directed for 30 days   Cartia  mg oral capsule,extended release 24hr 10/02/2019 take 1 capsule (240 mg) by oral route once daily for 90 days   citalopram 10 mg oral tablet  take 1 tablet (10 mg) by oral route once daily   diltiazem HCl 360 mg oral capsule,extended release 24 hr  take 1 capsule (360 mg) by oral route once daily   Eliquis 2.5  mg oral tablet  take 1 tablet (2.5 mg) by oral route 2 times per day   fluticasone propionate 50 mcg/actuation nasal spray,suspension 09/16/2019 spray 1 spray (50 mcg) in each nostril by intranasal route once daily for 30 days   furosemide 80 mg oral tablet 10/02/2019 take 1 tablet (80 mg) by oral route once daily for 90 days   hydralazine 10 mg oral tablet 10/02/2019 take 1 tablet (10 mg) by oral route 4 times per day with food for 90 days   Incruse Ellipta 62.5 mcg/actuation inhalation blister with device 01/06/2020 inhale 1 puff daily   metoprolol tartrate 25 mg oral tablet  take 1 tablet (25 mg) by oral route 2 times per day   omega-3 acid ethyl esters 1 gram oral capsule 01/06/2020 TAKE TWO CAPSULES BY MOUTH TWICE A DAY   potassium chloride 10 mEq oral capsule, extended release 10/02/2019 take 1 capsule (10 meq) by oral route 2 times per day with food for 90 days   ProAir HFA 90 mcg/actuation inhalation HFA aerosol inhaler 01/06/2020 inhale 1 - 2 puffs (90 - 180 mcg) by inhalation route every 4-6 hours as needed for 90 days   tamsulosin 0.4 mg oral capsule 10/02/2019 take 1 capsule (0.4 mg) by oral route once daily 1/2 hour following the same meal each day for 90 days   Zyrtec 10 mg oral tablet 10/02/2019 take 1 tablet at bedtime         Allergy List  Allergen Name Date Reaction Notes   NO KNOWN DRUG ALLERGIES --  --  --        Allergies Reconciled  Social History  Finding Status Start/Stop Quantity Notes   Tobacco Former --/-- --  --          Immunizations  NameDate Admin Mfg Trade Name Lot Number Route Inj VIS Given VIS Publication   HepB03/02/2020 SKB ENGERIX-B-ADULT [id number] IM  03/02/2020    Comments: NDC#[id number]   Sovhmwiro91/05/2019 PMC FLUZONE-HIGH DOSE [id number] IM LA 12/05/2019    Comments: NDC 43892-240-80   InfluenzaUnknown UNK Unknown TradeName [id number] NE NE 11/29/2017 08/07/2015   Comments: this is recorded out of the old system   Soicdhtau6837/05/2019 MSD PNEUMOVAX 23 [id number] IM RD 12/05/2019   "  Comments: NDC 0720-1218-22   TdapUnknown [facility] ADACEL [id number] NE NE 11/29/2017 02/24/2015   Comments: this is recorded out of old system/ ES         Review of Systems  · Constitutional  o Denies  o : fatigue, fever  · Cardiovascular  o Denies  o : chest pain, palpitations  · Respiratory  o Denies  o : shortness of breath, cough  · Gastrointestinal  o Denies  o : nausea, vomiting, diarrhea  · Psychiatric  o Denies  o : anxiety, depression      Vitals  Date Time BP Position Site L\R Cuff Size HR RR TEMP (F) WT  HT  BMI kg/m2 BSA m2 O2 Sat HC       03/02/2020 02:30 /110 Sitting    146 - R  97.6 204lbs 6oz 5'  7\" 32.01 2.09 99 %          Physical Examination  · Eyes  o Conjunctivae  o : conjunctivae normal  · Neck  o Inspection/Palpation  o : supple  o Thyroid  o : no thyromegaly  · Respiratory  o Respiratory Effort  o : breathing unlabored  o Auscultation of Lungs  o : clear to ascultation  · Cardiovascular  o Heart  o :   § Auscultation of Heart  § : regular rate and rhythm  o Peripheral Vascular System  o :   § Extremities  § : no edema  · Lymphatic  o Neck  o : no lymphadenopathy present  · Musculoskeletal  o General  o :   § General Musculoskeletal  § : No joint swelling or deformity., Muscle tone, strength, and development grossly normal.  · Skin and Subcutaneous Tissue  o General Inspection  o : no lesions present, no areas of discoloration, skin turgor normal, texture normal  · Neurologic  o Gait and Station  o :   § Gait Screening  § : normal gait  · Psychiatric  o Mood and Affect  o : mood normal, affect appropriate          Assessment  · Encounter for Medicare annual wellness exam     V70.0/Z00.00  · Screening for depression     V79.0/Z13.89  · Alcohol screening     V79.1/Z13.89      Plan  · Orders  o Falls Risk Assessment Completed (3288F) - V70.0/Z00.00 - 03/02/2020  o Brief hearing screening (written) [facility] () - V70.0/Z00.00 - 03/02/2020  o Annual Wellness Visit-includes a Personalized Prevention " Plan of Service (PPS), SUBSEQUENT VISIT (Medicare) () - V70.0/Z00.00 - 03/02/2020  o Presence or absence of urinary incontinence assessed (LUPILLO) (1090F) - V70.0/Z00.00 - 03/02/2020  o Annual depression screening using the PHQ-9 tool, 15 minutes (, 34842) - V79.0/Z13.89 - 03/02/2020  o Annual alcohol screening using the AUDIT-C tool, 15 minutes [facility] (50818, ) - V79.1/Z13.89 - 03/02/2020  o ACO-39: Current medications updated and reviewed () - - 03/02/2020  o ACO-19: Colorectal cancer screening results documented and reviewed (3017F) - - 03/02/2020   2016 @ [facility]  o Positive screen for clinical depression using a standardized tool and a follow-up plan documented () - - 03/02/2020  o ACO-14: Influenza immunization administered or previously received () - - 03/02/2020  o ACO-15: Pneumococcal Vaccine Administered or Previously Received (4040F) - - 03/02/2020  o ACO - Pt declines to or was not able to provide an Advance Care Plan or name a Surrogate Decision Maker (1124F) - - 03/02/2020  o Immunization Admin Fee (Single) ([facility]) (89163) - - 03/02/2020  o ACO-13: Fall Risk Screening with no falls in past year or only one fall without injury in the past year (1101F) - - 03/02/2020  o Engerix-B Adult Vaccine (20mcg/mL) (17511) - V70.0/Z00.00, V79.0/Z13.89, V79.1/Z13.89 - 03/02/2020   Vaccine - HepB; Dose: 0.5; Site: Left Upper Arm; Route: Intramuscular; Date: 03/02/2020 15:18:00; Exp: 05/28/2022; Lot: [id number]; Mfg: [employer]; TradeName: ENGERIX-B-ADULT; Administered By: Dionna Parikh MA; Comment: NDC#[id number]  · Medications  o Medications have been Reconciled  o Transition of Care or Provider Policy  · Instructions  o Health Risk Assessment has been reviewed with the patient.  o Written health screening schedule for next 5-10 years was established with patient; information scanned in chart and given to patient.  o Fall prevention methods discussed and a copy of recommendations given to  patient.  o Patient was educated/instructed on their diagnosis, treatment and medications prior to discharge from the clinic today.            Electronically Signed by: Ambrocio Guy MD -Author on March 2, 2020 07:17:13 PM   DC instructions